# Patient Record
Sex: FEMALE | Race: WHITE | NOT HISPANIC OR LATINO | Employment: FULL TIME | URBAN - METROPOLITAN AREA
[De-identification: names, ages, dates, MRNs, and addresses within clinical notes are randomized per-mention and may not be internally consistent; named-entity substitution may affect disease eponyms.]

---

## 2023-05-23 LAB
ALANINE AMINOTRANSFERASE (SGPT) (U/L) IN SER/PLAS: 13 U/L (ref 7–45)
ALBUMIN (G/DL) IN SER/PLAS: 4.7 G/DL (ref 3.4–5)
ALKALINE PHOSPHATASE (U/L) IN SER/PLAS: 48 U/L (ref 33–136)
ANION GAP IN SER/PLAS: 11 MMOL/L (ref 10–20)
ASPARTATE AMINOTRANSFERASE (SGOT) (U/L) IN SER/PLAS: 18 U/L (ref 9–39)
BASOPHILS (10*3/UL) IN BLOOD BY AUTOMATED COUNT: 0.01 X10E9/L (ref 0–0.1)
BASOPHILS/100 LEUKOCYTES IN BLOOD BY AUTOMATED COUNT: 0.2 % (ref 0–2)
BILIRUBIN TOTAL (MG/DL) IN SER/PLAS: 0.7 MG/DL (ref 0–1.2)
CALCIUM (MG/DL) IN SER/PLAS: 9.3 MG/DL (ref 8.6–10.3)
CARBON DIOXIDE, TOTAL (MMOL/L) IN SER/PLAS: 26 MMOL/L (ref 21–32)
CHLORIDE (MMOL/L) IN SER/PLAS: 91 MMOL/L (ref 98–107)
CREATININE (MG/DL) IN SER/PLAS: 0.82 MG/DL (ref 0.5–1.05)
EOSINOPHILS (10*3/UL) IN BLOOD BY AUTOMATED COUNT: 0.02 X10E9/L (ref 0–0.7)
EOSINOPHILS/100 LEUKOCYTES IN BLOOD BY AUTOMATED COUNT: 0.4 % (ref 0–6)
ERYTHROCYTE DISTRIBUTION WIDTH (RATIO) BY AUTOMATED COUNT: 11.9 % (ref 11.5–14.5)
ERYTHROCYTE MEAN CORPUSCULAR HEMOGLOBIN CONCENTRATION (G/DL) BY AUTOMATED: 34.5 G/DL (ref 32–36)
ERYTHROCYTE MEAN CORPUSCULAR VOLUME (FL) BY AUTOMATED COUNT: 109 FL (ref 80–100)
ERYTHROCYTES (10*6/UL) IN BLOOD BY AUTOMATED COUNT: 3.19 X10E12/L (ref 4–5.2)
GFR FEMALE: 82 ML/MIN/1.73M2
GLUCOSE (MG/DL) IN SER/PLAS: 100 MG/DL (ref 74–99)
HEMATOCRIT (%) IN BLOOD BY AUTOMATED COUNT: 34.8 % (ref 36–46)
HEMOGLOBIN (G/DL) IN BLOOD: 12 G/DL (ref 12–16)
IGG (MG/DL) IN SER/PLAS: 786 MG/DL (ref 700–1600)
IMMATURE GRANULOCYTES/100 LEUKOCYTES IN BLOOD BY AUTOMATED COUNT: 0.4 % (ref 0–0.9)
LEUKOCYTES (10*3/UL) IN BLOOD BY AUTOMATED COUNT: 5.7 X10E9/L (ref 4.4–11.3)
LYMPHOCYTES (10*3/UL) IN BLOOD BY AUTOMATED COUNT: 1.73 X10E9/L (ref 1.2–4.8)
LYMPHOCYTES/100 LEUKOCYTES IN BLOOD BY AUTOMATED COUNT: 30.6 % (ref 13–44)
MAGNESIUM (MG/DL) IN SER/PLAS: 1.43 MG/DL (ref 1.6–2.4)
MONOCYTES (10*3/UL) IN BLOOD BY AUTOMATED COUNT: 0.86 X10E9/L (ref 0.1–1)
MONOCYTES/100 LEUKOCYTES IN BLOOD BY AUTOMATED COUNT: 15.2 % (ref 2–10)
NEUTROPHILS (10*3/UL) IN BLOOD BY AUTOMATED COUNT: 3.02 X10E9/L (ref 1.2–7.7)
NEUTROPHILS/100 LEUKOCYTES IN BLOOD BY AUTOMATED COUNT: 53.2 % (ref 40–80)
PLATELETS (10*3/UL) IN BLOOD AUTOMATED COUNT: 186 X10E9/L (ref 150–450)
POTASSIUM (MMOL/L) IN SER/PLAS: 4.5 MMOL/L (ref 3.5–5.3)
PROTEIN TOTAL: 6.9 G/DL (ref 6.4–8.2)
SODIUM (MMOL/L) IN SER/PLAS: 123 MMOL/L (ref 136–145)
TACROLIMUS (NG/ML) IN BLOOD: 5.8 NG/ML (ref 2–15)
UREA NITROGEN (MG/DL) IN SER/PLAS: 19 MG/DL (ref 6–23)

## 2023-05-24 LAB
CYTOMEGALOVIRUS DNA, PCR COMMENT: NORMAL
CYTOMEGALOVIRUS DNA, PCR IU/ML: NOT DETECTED IU/ML
CYTOMEGALOVIRUS DNA, PCR LOG IU/ML: NORMAL LOG IU/ML
EBV PCR PLASMA LOG IU/ML: NORMAL LOG IU/ML
EBV PCR, QUANT, PLASMA: NOT DETECTED IU/ML

## 2023-08-15 LAB
ALANINE AMINOTRANSFERASE (SGPT) (U/L) IN SER/PLAS: 14 U/L (ref 7–45)
ALBUMIN (G/DL) IN SER/PLAS: 4.5 G/DL (ref 3.4–5)
ALKALINE PHOSPHATASE (U/L) IN SER/PLAS: 51 U/L (ref 33–136)
ANION GAP IN SER/PLAS: 13 MMOL/L (ref 10–20)
ASPARTATE AMINOTRANSFERASE (SGOT) (U/L) IN SER/PLAS: 18 U/L (ref 9–39)
BASOPHILS (10*3/UL) IN BLOOD BY AUTOMATED COUNT: 0.02 X10E9/L (ref 0–0.1)
BASOPHILS/100 LEUKOCYTES IN BLOOD BY AUTOMATED COUNT: 0.4 % (ref 0–2)
BILIRUBIN TOTAL (MG/DL) IN SER/PLAS: 0.5 MG/DL (ref 0–1.2)
CALCIUM (MG/DL) IN SER/PLAS: 9.4 MG/DL (ref 8.6–10.3)
CARBON DIOXIDE, TOTAL (MMOL/L) IN SER/PLAS: 25 MMOL/L (ref 21–32)
CHLORIDE (MMOL/L) IN SER/PLAS: 92 MMOL/L (ref 98–107)
CREATININE (MG/DL) IN SER/PLAS: 0.8 MG/DL (ref 0.5–1.05)
EOSINOPHILS (10*3/UL) IN BLOOD BY AUTOMATED COUNT: 0.02 X10E9/L (ref 0–0.7)
EOSINOPHILS/100 LEUKOCYTES IN BLOOD BY AUTOMATED COUNT: 0.4 % (ref 0–6)
ERYTHROCYTE DISTRIBUTION WIDTH (RATIO) BY AUTOMATED COUNT: 12.2 % (ref 11.5–14.5)
ERYTHROCYTE MEAN CORPUSCULAR HEMOGLOBIN CONCENTRATION (G/DL) BY AUTOMATED: 34.7 G/DL (ref 32–36)
ERYTHROCYTE MEAN CORPUSCULAR VOLUME (FL) BY AUTOMATED COUNT: 112 FL (ref 80–100)
ERYTHROCYTES (10*6/UL) IN BLOOD BY AUTOMATED COUNT: 3.11 X10E12/L (ref 4–5.2)
GFR FEMALE: 84 ML/MIN/1.73M2
GLUCOSE (MG/DL) IN SER/PLAS: 105 MG/DL (ref 74–99)
HEMATOCRIT (%) IN BLOOD BY AUTOMATED COUNT: 34.9 % (ref 36–46)
HEMOGLOBIN (G/DL) IN BLOOD: 12.1 G/DL (ref 12–16)
IGG (MG/DL) IN SER/PLAS: 844 MG/DL (ref 700–1600)
IMMATURE GRANULOCYTES/100 LEUKOCYTES IN BLOOD BY AUTOMATED COUNT: 0.2 % (ref 0–0.9)
LEUKOCYTES (10*3/UL) IN BLOOD BY AUTOMATED COUNT: 4.7 X10E9/L (ref 4.4–11.3)
LYMPHOCYTES (10*3/UL) IN BLOOD BY AUTOMATED COUNT: 1.21 X10E9/L (ref 1.2–4.8)
LYMPHOCYTES/100 LEUKOCYTES IN BLOOD BY AUTOMATED COUNT: 26 % (ref 13–44)
MAGNESIUM (MG/DL) IN SER/PLAS: 1.64 MG/DL (ref 1.6–2.4)
MONOCYTES (10*3/UL) IN BLOOD BY AUTOMATED COUNT: 0.71 X10E9/L (ref 0.1–1)
MONOCYTES/100 LEUKOCYTES IN BLOOD BY AUTOMATED COUNT: 15.2 % (ref 2–10)
NEUTROPHILS (10*3/UL) IN BLOOD BY AUTOMATED COUNT: 2.69 X10E9/L (ref 1.2–7.7)
NEUTROPHILS/100 LEUKOCYTES IN BLOOD BY AUTOMATED COUNT: 57.8 % (ref 40–80)
PLATELETS (10*3/UL) IN BLOOD AUTOMATED COUNT: 201 X10E9/L (ref 150–450)
POTASSIUM (MMOL/L) IN SER/PLAS: 4.7 MMOL/L (ref 3.5–5.3)
PROTEIN TOTAL: 6.9 G/DL (ref 6.4–8.2)
SODIUM (MMOL/L) IN SER/PLAS: 125 MMOL/L (ref 136–145)
TACROLIMUS (NG/ML) IN BLOOD: 6.2 NG/ML (ref 2–15)
UREA NITROGEN (MG/DL) IN SER/PLAS: 20 MG/DL (ref 6–23)

## 2023-09-05 LAB
ALBUMIN (G/DL) IN SER/PLAS: 4.4 G/DL (ref 3.4–5)
ANION GAP IN SER/PLAS: 13 MMOL/L (ref 10–20)
CALCIUM (MG/DL) IN SER/PLAS: 9.3 MG/DL (ref 8.6–10.3)
CARBON DIOXIDE, TOTAL (MMOL/L) IN SER/PLAS: 24 MMOL/L (ref 21–32)
CHLORIDE (MMOL/L) IN SER/PLAS: 98 MMOL/L (ref 98–107)
CREATININE (MG/DL) IN SER/PLAS: 0.71 MG/DL (ref 0.5–1.05)
CREATININE (MG/DL) IN URINE: 14.6 MG/DL (ref 20–320)
CREATININE (MG/DL) IN URINE: 14.6 MG/DL (ref 20–320)
GFR FEMALE: >90 ML/MIN/1.73M2
GLUCOSE (MG/DL) IN SER/PLAS: 89 MG/DL (ref 74–99)
PHOSPHATE (MG/DL) IN SER/PLAS: 2.9 MG/DL (ref 2.5–4.9)
POTASSIUM (MMOL/L) IN SER/PLAS: 4.4 MMOL/L (ref 3.5–5.3)
POTASSIUM URINE RANDOM: 15 MMOL/L
POTASSIUM/CREATININE (MMOL/G) IN URINE: 103 MMOL/G CREAT
SODIUM (MMOL/L) IN SER/PLAS: 131 MMOL/L (ref 136–145)
SODIUM URINE RANDOM: 68 MMOL/L
SODIUM/CREATININE (MMOL/G) IN URINE: 466 MMOL/G CREAT
UREA NITROGEN (MG/DL) IN SER/PLAS: 16 MG/DL (ref 6–23)

## 2023-09-06 LAB
OSMOLALITY, RANDOM URINE: 217 MOSM/KG (ref 200–1200)
OSMOLALITY, SERUM: 278 MOSM/KG H2O (ref 280–300)

## 2023-10-04 DIAGNOSIS — Z94.2 LUNG TRANSPLANT RECIPIENT (MULTI): Primary | ICD-10-CM

## 2023-10-04 RX ORDER — VALACYCLOVIR HYDROCHLORIDE 500 MG/1
500 TABLET, FILM COATED ORAL DAILY
Qty: 90 TABLET | Refills: 3 | Status: SHIPPED | OUTPATIENT
Start: 2023-10-04

## 2023-10-13 DIAGNOSIS — Z94.2 LUNG TRANSPLANT RECIPIENT (MULTI): Primary | ICD-10-CM

## 2023-10-13 RX ORDER — FOLIC ACID 1 MG/1
1 TABLET ORAL DAILY
Qty: 90 TABLET | Refills: 3 | Status: SHIPPED | OUTPATIENT
Start: 2023-10-13

## 2023-11-13 ENCOUNTER — LAB (OUTPATIENT)
Dept: LAB | Facility: LAB | Age: 58
End: 2023-11-13
Payer: COMMERCIAL

## 2023-11-13 DIAGNOSIS — Z94.2 LUNG TRANSPLANTED (MULTI): ICD-10-CM

## 2023-11-13 LAB
25(OH)D3 SERPL-MCNC: 47 NG/ML (ref 30–100)
ALBUMIN SERPL BCP-MCNC: 4.7 G/DL (ref 3.4–5)
ALP SERPL-CCNC: 54 U/L (ref 33–136)
ALT SERPL W P-5'-P-CCNC: 17 U/L (ref 7–45)
ANION GAP SERPL CALC-SCNC: 11 MMOL/L (ref 10–20)
AST SERPL W P-5'-P-CCNC: 18 U/L (ref 9–39)
BASOPHILS # BLD AUTO: 0.01 X10*3/UL (ref 0–0.1)
BASOPHILS NFR BLD AUTO: 0.2 %
BILIRUB SERPL-MCNC: 0.7 MG/DL (ref 0–1.2)
BUN SERPL-MCNC: 25 MG/DL (ref 6–23)
CALCIUM SERPL-MCNC: 9.2 MG/DL (ref 8.6–10.3)
CHLORIDE SERPL-SCNC: 97 MMOL/L (ref 98–107)
CO2 SERPL-SCNC: 27 MMOL/L (ref 21–32)
CREAT SERPL-MCNC: 1.01 MG/DL (ref 0.5–1.05)
EOSINOPHIL # BLD AUTO: 0.02 X10*3/UL (ref 0–0.7)
EOSINOPHIL NFR BLD AUTO: 0.3 %
ERYTHROCYTE [DISTWIDTH] IN BLOOD BY AUTOMATED COUNT: 12.3 % (ref 11.5–14.5)
GFR SERPL CREATININE-BSD FRML MDRD: 64 ML/MIN/1.73M*2
GLUCOSE SERPL-MCNC: 106 MG/DL (ref 74–99)
HCT VFR BLD AUTO: 36 % (ref 36–46)
HGB BLD-MCNC: 12.2 G/DL (ref 12–16)
IMM GRANULOCYTES # BLD AUTO: 0.06 X10*3/UL (ref 0–0.7)
IMM GRANULOCYTES NFR BLD AUTO: 1 % (ref 0–0.9)
LYMPHOCYTES # BLD AUTO: 1.92 X10*3/UL (ref 1.2–4.8)
LYMPHOCYTES NFR BLD AUTO: 32.9 %
MAGNESIUM SERPL-MCNC: 1.61 MG/DL (ref 1.6–2.4)
MCH RBC QN AUTO: 37.8 PG (ref 26–34)
MCHC RBC AUTO-ENTMCNC: 33.9 G/DL (ref 32–36)
MCV RBC AUTO: 112 FL (ref 80–100)
MONOCYTES # BLD AUTO: 0.74 X10*3/UL (ref 0.1–1)
MONOCYTES NFR BLD AUTO: 12.7 %
NEUTROPHILS # BLD AUTO: 3.08 X10*3/UL (ref 1.2–7.7)
NEUTROPHILS NFR BLD AUTO: 52.9 %
NRBC BLD-RTO: 0 /100 WBCS (ref 0–0)
PLATELET # BLD AUTO: 190 X10*3/UL (ref 150–450)
POTASSIUM SERPL-SCNC: 3.8 MMOL/L (ref 3.5–5.3)
PROT SERPL-MCNC: 6.9 G/DL (ref 6.4–8.2)
RBC # BLD AUTO: 3.23 X10*6/UL (ref 4–5.2)
SODIUM SERPL-SCNC: 131 MMOL/L (ref 136–145)
WBC # BLD AUTO: 5.8 X10*3/UL (ref 4.4–11.3)

## 2023-11-13 PROCEDURE — 86833 HLA CLASS II HIGH DEFIN QUAL: CPT

## 2023-11-13 PROCEDURE — 80053 COMPREHEN METABOLIC PANEL: CPT

## 2023-11-13 PROCEDURE — 86832 HLA CLASS I HIGH DEFIN QUAL: CPT

## 2023-11-13 PROCEDURE — 83735 ASSAY OF MAGNESIUM: CPT

## 2023-11-13 PROCEDURE — 80197 ASSAY OF TACROLIMUS: CPT

## 2023-11-13 PROCEDURE — 85025 COMPLETE CBC W/AUTO DIFF WBC: CPT

## 2023-11-13 PROCEDURE — 36415 COLL VENOUS BLD VENIPUNCTURE: CPT

## 2023-11-13 PROCEDURE — 82306 VITAMIN D 25 HYDROXY: CPT

## 2023-11-13 PROCEDURE — 82784 ASSAY IGA/IGD/IGG/IGM EACH: CPT

## 2023-11-14 ENCOUNTER — APPOINTMENT (OUTPATIENT)
Dept: TRANSPLANT | Facility: HOSPITAL | Age: 58
End: 2023-11-14

## 2023-11-14 ENCOUNTER — HOSPITAL ENCOUNTER (OUTPATIENT)
Dept: RESPIRATORY THERAPY | Facility: HOSPITAL | Age: 58
Discharge: HOME | End: 2023-11-14
Payer: COMMERCIAL

## 2023-11-14 ENCOUNTER — OFFICE VISIT (OUTPATIENT)
Dept: TRANSPLANT | Facility: HOSPITAL | Age: 58
End: 2023-11-14
Payer: COMMERCIAL

## 2023-11-14 ENCOUNTER — APPOINTMENT (OUTPATIENT)
Dept: TRANSPLANT | Facility: HOSPITAL | Age: 58
End: 2023-11-14
Payer: COMMERCIAL

## 2023-11-14 ENCOUNTER — HOSPITAL ENCOUNTER (OUTPATIENT)
Dept: RESPIRATORY THERAPY | Facility: HOSPITAL | Age: 58
End: 2023-11-14
Payer: COMMERCIAL

## 2023-11-14 VITALS
HEART RATE: 82 BPM | WEIGHT: 174.2 LBS | SYSTOLIC BLOOD PRESSURE: 113 MMHG | TEMPERATURE: 97.7 F | BODY MASS INDEX: 27.28 KG/M2 | OXYGEN SATURATION: 96 % | DIASTOLIC BLOOD PRESSURE: 76 MMHG

## 2023-11-14 DIAGNOSIS — Z94.2 S/P LUNG TRANSPLANT (MULTI): ICD-10-CM

## 2023-11-14 DIAGNOSIS — Z94.2 LUNG TRANSPLANT RECIPIENT (MULTI): Primary | ICD-10-CM

## 2023-11-14 PROBLEM — I10 BENIGN ESSENTIAL HYPERTENSION: Status: ACTIVE | Noted: 2023-11-14

## 2023-11-14 PROBLEM — N60.19 CYSTIC BREAST: Status: ACTIVE | Noted: 2023-11-14

## 2023-11-14 PROBLEM — D22.30 MELANOCYTIC NEVI OF UNSPECIFIED PART OF FACE: Status: ACTIVE | Noted: 2022-12-19

## 2023-11-14 PROBLEM — L82.1 OTHER SEBORRHEIC KERATOSIS: Status: ACTIVE | Noted: 2022-12-19

## 2023-11-14 PROBLEM — M85.80 OSTEOPENIA: Status: ACTIVE | Noted: 2023-11-14

## 2023-11-14 PROBLEM — J31.0 CHRONIC RHINITIS: Status: ACTIVE | Noted: 2023-11-14

## 2023-11-14 PROBLEM — M79.671 PAIN IN BOTH FEET: Status: ACTIVE | Noted: 2018-08-20

## 2023-11-14 PROBLEM — G43.009 COMMON MIGRAINE WITHOUT AURA: Status: ACTIVE | Noted: 2023-11-14

## 2023-11-14 PROBLEM — Z79.60 LONG-TERM USE OF IMMUNOSUPPRESSANT MEDICATION: Status: ACTIVE | Noted: 2023-11-14

## 2023-11-14 PROBLEM — M79.672 PAIN IN BOTH FEET: Status: ACTIVE | Noted: 2018-08-20

## 2023-11-14 PROBLEM — N60.11 FIBROCYSTIC DISEASE OF RIGHT BREAST: Status: ACTIVE | Noted: 2023-11-14

## 2023-11-14 PROBLEM — E83.42 HYPOMAGNESEMIA: Status: ACTIVE | Noted: 2023-11-14

## 2023-11-14 PROBLEM — Z98.890 STATUS POST RIGHT BREAST BIOPSY: Status: ACTIVE | Noted: 2023-11-14

## 2023-11-14 PROBLEM — D22.5 MELANOCYTIC NEVI OF TRUNK: Status: ACTIVE | Noted: 2022-12-19

## 2023-11-14 PROBLEM — Z94.9 TRANSPLANT: Status: ACTIVE | Noted: 2023-11-14

## 2023-11-14 LAB
CMV DNA SERPL NAA+PROBE-LOG IU: NORMAL {LOG_IU}/ML
IGG SERPL-MCNC: 877 MG/DL (ref 700–1600)
LABORATORY COMMENT REPORT: NOT DETECTED
MGC ASCENT PFT - FEV1 - PRE: 1.99
MGC ASCENT PFT - FEV1 - PREDICTED: 2.64
MGC ASCENT PFT - FVC - PRE: 3.02
MGC ASCENT PFT - FVC - PREDICTED: 3.34
TACROLIMUS BLD-MCNC: 6.8 NG/ML

## 2023-11-14 PROCEDURE — 3074F SYST BP LT 130 MM HG: CPT | Performed by: INTERNAL MEDICINE

## 2023-11-14 PROCEDURE — 99215 OFFICE O/P EST HI 40 MIN: CPT | Performed by: INTERNAL MEDICINE

## 2023-11-14 PROCEDURE — 3078F DIAST BP <80 MM HG: CPT | Performed by: INTERNAL MEDICINE

## 2023-11-14 PROCEDURE — 94010 BREATHING CAPACITY TEST: CPT | Performed by: INTERNAL MEDICINE

## 2023-11-14 PROCEDURE — 94010 BREATHING CAPACITY TEST: CPT

## 2023-11-14 RX ORDER — LOSARTAN POTASSIUM 50 MG/1
100 TABLET ORAL DAILY
COMMUNITY
End: 2024-03-26 | Stop reason: WASHOUT

## 2023-11-14 RX ORDER — ALENDRONATE SODIUM 70 MG/1
70 TABLET ORAL
COMMUNITY
Start: 2014-02-14

## 2023-11-14 RX ORDER — FLUTICASONE PROPIONATE 50 MCG
1 SPRAY, SUSPENSION (ML) NASAL DAILY
COMMUNITY

## 2023-11-14 RX ORDER — ATENOLOL 25 MG/1
25 TABLET ORAL DAILY
COMMUNITY
Start: 2003-05-12

## 2023-11-14 RX ORDER — TACROLIMUS 1 MG/1
2 CAPSULE ORAL 2 TIMES DAILY
COMMUNITY

## 2023-11-14 RX ORDER — DAPSONE 100 MG/1
100 TABLET ORAL 3 TIMES WEEKLY
COMMUNITY
Start: 2003-05-12

## 2023-11-14 RX ORDER — AZITHROMYCIN 250 MG/1
250 TABLET, FILM COATED ORAL DAILY
Qty: 7 TABLET | Refills: 0 | Status: SHIPPED | OUTPATIENT
Start: 2023-11-14 | End: 2023-11-21

## 2023-11-14 RX ORDER — MODERNA COVID-19 VACCINE, BIVALENT 25; 25 UG/.5ML; UG/.5ML
0.5 INJECTION, SUSPENSION INTRAMUSCULAR ONCE
COMMUNITY
Start: 2022-12-08 | End: 2024-03-26 | Stop reason: WASHOUT

## 2023-11-14 RX ORDER — MYCOPHENOLATE MOFETIL 250 MG/1
1250 CAPSULE ORAL 2 TIMES DAILY
COMMUNITY
End: 2024-01-08 | Stop reason: SDUPTHER

## 2023-11-14 RX ORDER — ELETRIPTAN HYDROBROMIDE 20 MG/1
20 TABLET, FILM COATED ORAL ONCE AS NEEDED
COMMUNITY
Start: 2013-12-03 | End: 2024-03-26 | Stop reason: SDUPTHER

## 2023-11-14 ASSESSMENT — ENCOUNTER SYMPTOMS
ENDOCRINE NEGATIVE: 1
EYES NEGATIVE: 1
NEUROLOGICAL NEGATIVE: 1
HEMATOLOGIC/LYMPHATIC NEGATIVE: 1
CARDIOVASCULAR NEGATIVE: 1
RESPIRATORY NEGATIVE: 1
GASTROINTESTINAL NEGATIVE: 1
PSYCHIATRIC NEGATIVE: 1
MUSCULOSKELETAL NEGATIVE: 1
ALLERGIC/IMMUNOLOGIC NEGATIVE: 1
CONSTITUTIONAL NEGATIVE: 1

## 2023-11-14 ASSESSMENT — PAIN SCALES - GENERAL: PAINLEVEL: 0-NO PAIN

## 2023-11-15 LAB
HLA RESULTS: NORMAL
HLA-A+B+C AB NFR SER: NORMAL %
HLA-DP+DQ+DR AB NFR SER: NORMAL %

## 2023-11-16 DIAGNOSIS — Z94.2 LUNG TRANSPLANTED (MULTI): ICD-10-CM

## 2023-12-11 ASSESSMENT — DERMATOLOGY QUALITY OF LIFE (QOL) ASSESSMENT
RATE HOW BOTHERED YOU ARE BY SYMPTOMS OF YOUR SKIN PROBLEM (EG, ITCHING, STINGING BURNING, HURTING OR SKIN IRRITATION): 0 - NEVER BOTHERED
RATE HOW BOTHERED YOU ARE BY SYMPTOMS OF YOUR SKIN PROBLEM (EG, ITCHING, STINGING BURNING, HURTING OR SKIN IRRITATION): 0 - NEVER BOTHERED
WHAT SINGLE SKIN CONDITION LISTED BELOW IS THE PATIENT ANSWERING THE QUALITY-OF-LIFE ASSESSMENT QUESTIONS ABOUT: NONE OF THE ABOVE
RATE HOW BOTHERED YOU ARE BY EFFECTS OF YOUR SKIN PROBLEMS ON YOUR ACTIVITIES (EG, GOING OUT, ACCOMPLISHING WHAT YOU WANT, WORK ACTIVITIES OR YOUR RELATIONSHIPS WITH OTHERS): 0 - NEVER BOTHERED
RATE HOW EMOTIONALLY BOTHERED YOU ARE BY YOUR SKIN PROBLEM (FOR EXAMPLE, WORRY, EMBARRASSMENT, FRUSTRATION): 0 - NEVER BOTHERED
RATE HOW EMOTIONALLY BOTHERED YOU ARE BY YOUR SKIN PROBLEM (FOR EXAMPLE, WORRY, EMBARRASSMENT, FRUSTRATION): 0 - NEVER BOTHERED
RATE HOW BOTHERED YOU ARE BY EFFECTS OF YOUR SKIN PROBLEMS ON YOUR ACTIVITIES (EG, GOING OUT, ACCOMPLISHING WHAT YOU WANT, WORK ACTIVITIES OR YOUR RELATIONSHIPS WITH OTHERS): 0 - NEVER BOTHERED
WHAT SINGLE SKIN CONDITION LISTED BELOW IS THE PATIENT ANSWERING THE QUALITY-OF-LIFE ASSESSMENT QUESTIONS ABOUT: NONE OF THE ABOVE

## 2023-12-15 ENCOUNTER — LAB (OUTPATIENT)
Dept: LAB | Facility: LAB | Age: 58
End: 2023-12-15
Payer: COMMERCIAL

## 2023-12-15 DIAGNOSIS — Z94.2 LUNG TRANSPLANTED (MULTI): ICD-10-CM

## 2023-12-15 LAB
CHOLEST SERPL-MCNC: 188 MG/DL (ref 0–199)
CHOLESTEROL/HDL RATIO: 2.7
HDLC SERPL-MCNC: 69.5 MG/DL
LDLC SERPL CALC-MCNC: 102 MG/DL
NON HDL CHOLESTEROL: 119 MG/DL (ref 0–149)
TRIGL SERPL-MCNC: 82 MG/DL (ref 0–149)
VLDL: 16 MG/DL (ref 0–40)

## 2023-12-15 PROCEDURE — 80061 LIPID PANEL: CPT

## 2023-12-15 PROCEDURE — 36415 COLL VENOUS BLD VENIPUNCTURE: CPT

## 2023-12-18 ENCOUNTER — HOSPITAL ENCOUNTER (OUTPATIENT)
Dept: RADIOLOGY | Facility: HOSPITAL | Age: 58
Discharge: HOME | End: 2023-12-18
Payer: COMMERCIAL

## 2023-12-18 ENCOUNTER — OFFICE VISIT (OUTPATIENT)
Dept: DERMATOLOGY | Facility: HOSPITAL | Age: 58
End: 2023-12-18
Payer: COMMERCIAL

## 2023-12-18 DIAGNOSIS — B35.1 ONYCHOMYCOSIS: ICD-10-CM

## 2023-12-18 DIAGNOSIS — L21.9 SEBORRHEIC DERMATITIS: ICD-10-CM

## 2023-12-18 DIAGNOSIS — Z12.83 SCREENING EXAM FOR SKIN CANCER: ICD-10-CM

## 2023-12-18 DIAGNOSIS — L82.1 SEBORRHEIC KERATOSIS: ICD-10-CM

## 2023-12-18 DIAGNOSIS — D22.9 MULTIPLE BENIGN NEVI: Primary | ICD-10-CM

## 2023-12-18 DIAGNOSIS — Z94.2 LUNG TRANSPLANTED (MULTI): ICD-10-CM

## 2023-12-18 PROCEDURE — 99213 OFFICE O/P EST LOW 20 MIN: CPT | Performed by: DERMATOLOGY

## 2023-12-18 PROCEDURE — 71046 X-RAY EXAM CHEST 2 VIEWS: CPT | Performed by: RADIOLOGY

## 2023-12-18 PROCEDURE — 71046 X-RAY EXAM CHEST 2 VIEWS: CPT | Mod: FY

## 2023-12-18 RX ORDER — CIPROFLOXACIN AND DEXAMETHASONE 3; 1 MG/ML; MG/ML
4 SUSPENSION/ DROPS AURICULAR (OTIC) 2 TIMES DAILY
Qty: 7.5 ML | Refills: 3 | Status: SHIPPED | OUTPATIENT
Start: 2023-12-18 | End: 2024-03-26 | Stop reason: WASHOUT

## 2023-12-18 ASSESSMENT — DERMATOLOGY PATIENT ASSESSMENT
ARE YOU ON BIRTH CONTROL: NO
DO YOU USE A TANNING BED: NO
HAVE YOU HAD OR DO YOU HAVE VASCULAR DISEASE: NO
HAVE YOU HAD OR DO YOU HAVE A STAPH INFECTION: NO
DO YOU HAVE ANY NEW OR CHANGING LESIONS: NO
DO YOU HAVE IRREGULAR MENSTRUAL CYCLES: NO
DO YOU USE SUNSCREEN: OCCASIONALLY
ARE YOU TRYING TO GET PREGNANT: NO

## 2023-12-18 ASSESSMENT — DERMATOLOGY QUALITY OF LIFE (QOL) ASSESSMENT
DID YOU DOCUMENT A PATIENT REASON(S) FOR NOT USING THE QUALITY OF LIFE ASSESSMENT: NO
WAS THE PATIENT DIAGNOSED WITH A SKIN CONDITION THAT IS INCLUDED IN THE DENOMINATOR DEFINITION (E.G.PSORIASIS, DERMATITIS) BUT IDENTIFIED A SKIN CONDITION THAT IS NOT (E.G. MELANOMA, NEVI) THE MAIN CONDITION ON THEIR ASSESSMENT: NO
RATE HOW BOTHERED YOU ARE BY SYMPTOMS OF YOUR SKIN PROBLEM (EG, ITCHING, STINGING BURNING, HURTING OR SKIN IRRITATION): 0 - NEVER BOTHERED
RATE HOW BOTHERED YOU ARE BY EFFECTS OF YOUR SKIN PROBLEMS ON YOUR ACTIVITIES (EG, GOING OUT, ACCOMPLISHING WHAT YOU WANT, WORK ACTIVITIES OR YOUR RELATIONSHIPS WITH OTHERS): 0 - NEVER BOTHERED
DATE THE QUALITY-OF-LIFE ASSESSMENT WAS COMPLETED: 66826
ARE THERE EXCLUSIONS OR EXCEPTIONS FOR THE QUALITY OF LIFE ASSESSMENT: NO
RATE HOW EMOTIONALLY BOTHERED YOU ARE BY YOUR SKIN PROBLEM (FOR EXAMPLE, WORRY, EMBARRASSMENT, FRUSTRATION): 0 - NEVER BOTHERED
DID THE PATIENT HAVE A SEVERE MENTAL AND/OR PHYSICAL INCAPACITY THAT PREVENTED HIM OR HER FROM COMPLETING THE ASSESSMENT: NO

## 2023-12-18 ASSESSMENT — PATIENT GLOBAL ASSESSMENT (PGA): PATIENT GLOBAL ASSESSMENT: PATIENT GLOBAL ASSESSMENT:  1 - CLEAR

## 2023-12-18 ASSESSMENT — ITCH NUMERIC RATING SCALE: HOW SEVERE IS YOUR ITCHING?: 0

## 2023-12-18 NOTE — PROGRESS NOTES
Subjective     Carmen Ash is a 58 y.o. female who presents for the following: Skin Check.     Lung transplant 1999 still on immunosuppression, doing well    Review of Systems:  No other skin or systemic complaints other than what is documented elsewhere in the note.    The following portions of the chart were reviewed this encounter and updated as appropriate:  Tobacco  Allergies  Meds  Problems  Med Hx  Surg Hx         Skin Cancer History  No skin cancer on file.      Specialty Problems          Dermatology Problems    Melanocytic nevi of trunk    Melanocytic nevi of unspecified part of face    Other seborrheic keratosis        Objective   Well appearing patient in no apparent distress; mood and affect are within normal limits.    A full examination was performed including scalp, head, eyes, ears, nose, lips, neck, chest, axillae, abdomen, back, buttocks, bilateral upper extremities, bilateral lower extremities, hands, feet, fingers, toes, fingernails, and toenails. All findings within normal limits unless otherwise noted below.    Assessment/Plan   1. Multiple benign nevi  Brown and tan macules and papules with reassuring findings on dermoscopy    -These lesions have benign, reassuring patterns on dermoscopy  -Recommend continued self observation, and to contact the office if any changes in nevi are noticed    2. Seborrheic keratosis  Stuck on, waxy macule(s)/papule(s)/plaque(s) with comedo-like openings and milia like cysts    -Discussed the nature of the diagnosis  -Reassurance, recommend continued observation    3. Screening exam for skin cancer    Full body skin exam  -No lesions concerning for malignancy on the remainder the skin exam today   - The ugly duckling sign was discussed. Monitor for any skin lesions that are different in color, shape, or size than others on body  -Sun protection was discussed. Recommend SPF 30+, hats with brims, sun protective clothing, and avoiding sun exposure between 10  AM and 2 PM whenever possible  -Recommend regular skin exams or sooner if new or changing lesions       Related Procedures  Follow Up In Dermatology - Established Patient    4. Onychomycosis (3)  Left Foot - Anterior, Left Thumb Nail Plate, Right Foot - Anterior  Thickened, discolored nail plate(s) with subungual debris. All 10 toe nails, left thumb nail    -Discussed nature of the condition  -This is a chronic issue that is often difficult to treat and will often recur once treated  -Reviewed treatment options    - Declines oral at this time and also declines topical, no pain    5. Seborrheic dermatitis  Left Ear, Right Ear, Scalp  Clear on exam today    Well-controlled with OTC dandruff shampoo alternating with regular dandruff shampoo    Also uses ear drops as needed, needs refill    Related Medications  ciprofloxacin-dexamethasone (Ciprodex) otic suspension  Administer 4 drops into each ear 2 times a day. For up to 2 weeks as needed for scaling/itching        Full Skin Exam 1 year

## 2024-01-07 ENCOUNTER — TELEPHONE (OUTPATIENT)
Dept: CARDIOLOGY | Facility: HOSPITAL | Age: 59
End: 2024-01-07
Payer: COMMERCIAL

## 2024-01-07 NOTE — TELEPHONE ENCOUNTER
"Mrs. Ash is a remote lung transplant patient > 20 years ago who called this morning stating that she tested positive for COVID. She originally began having a dry cough 5 days ago (last Wednesday) but overall did not feel bad. This morning she awoke and felt \"achy\" so she tested for COVID. She denies fevers, chills or GI symptoms. She endorses a slightly productive cough now but no SOB. She has had COVID in the past.     IGG levels on 11/13/23 were appropriate: 877.     I discussed her symptoms with Dr. Chung who agrees on supportive care. Unfortunately, Mrs. Ash is on the tail end of starting Paxlovid, and the risks of this medication (especially interactions with immunosuppression) outweigh the benefits given her mild symptoms.     Mrs. Ash was advised to decrease her MMF from 1250mg BID to 500mg BID during her active infection. I instructed her to call the lung transplant offices in 5 days to assess her symptoms and discuss increasing her MMF dose at that time. I also educated her on the importance of isolating for 10xdays since symptoms, and potentially up to 20xdays if symptoms remain. I also advised her to wear a mask in public if she does leave isolation. Lastly, I encouraged her to obtain a pulse-ox to monitor her SpO2. She states she will have her mother in law bring one over today.     *Of note, prescription in [facility] for MMF was not changed to reflect 500mg BID, as I anticipate the lung transplant team will return to her previous dose*   "

## 2024-01-08 ENCOUNTER — TELEPHONE (OUTPATIENT)
Dept: TRANSPLANT | Facility: HOSPITAL | Age: 59
End: 2024-01-08
Payer: COMMERCIAL

## 2024-01-08 DIAGNOSIS — Z94.2 LUNG TRANSPLANT RECIPIENT (MULTI): Primary | ICD-10-CM

## 2024-01-08 RX ORDER — MYCOPHENOLATE MOFETIL 250 MG/1
1250 CAPSULE ORAL 2 TIMES DAILY
Qty: 600 CAPSULE | Refills: 11 | Status: SHIPPED | OUTPATIENT
Start: 2024-01-08

## 2024-01-08 NOTE — TELEPHONE ENCOUNTER
Patient called for a refill for her [facility] supply with 12 refills.

## 2024-01-12 ENCOUNTER — TELEPHONE (OUTPATIENT)
Dept: TRANSPLANT | Facility: HOSPITAL | Age: 59
End: 2024-01-12
Payer: COMMERCIAL

## 2024-01-12 NOTE — TELEPHONE ENCOUNTER
Called and spoke with patient     She wanted to know if she should continue taking decreased dose of cellcept or go back to her original dosage.   Dr. Chung advised her continue to take the decreased dose for another week and to let us know how she is feeling. Advised to call us back to update us and then we can make the decision about increasing her dose again.   Patient stated she is feeling better   All questions answered

## 2024-01-19 ENCOUNTER — TELEPHONE (OUTPATIENT)
Dept: TRANSPLANT | Facility: HOSPITAL | Age: 59
End: 2024-01-19
Payer: COMMERCIAL

## 2024-01-19 NOTE — TELEPHONE ENCOUNTER
Patient called to report that she has been feeling back to normal, no fever no shortness of breath, wants to know if she can go back on her regular medication.

## 2024-01-19 NOTE — TELEPHONE ENCOUNTER
Patient called stating she is feeling better   Returned call. Would like to go back to her normal medication dosages that were decreased while she was recovering from covid.   Dr. Chung notified and advised that she can go back to her normal dosages   All questions answered

## 2024-03-20 ENCOUNTER — LAB (OUTPATIENT)
Dept: LAB | Facility: LAB | Age: 59
End: 2024-03-20
Payer: COMMERCIAL

## 2024-03-20 DIAGNOSIS — Z94.2 LUNG TRANSPLANTED (MULTI): ICD-10-CM

## 2024-03-20 LAB
ALBUMIN SERPL BCP-MCNC: 4.5 G/DL (ref 3.4–5)
ALP SERPL-CCNC: 52 U/L (ref 33–136)
ALT SERPL W P-5'-P-CCNC: 21 U/L (ref 7–45)
ANION GAP SERPL CALC-SCNC: 14 MMOL/L (ref 10–20)
AST SERPL W P-5'-P-CCNC: 17 U/L (ref 9–39)
BASOPHILS # BLD AUTO: 0.01 X10*3/UL (ref 0–0.1)
BASOPHILS NFR BLD AUTO: 0.2 %
BILIRUB SERPL-MCNC: 0.7 MG/DL (ref 0–1.2)
BUN SERPL-MCNC: 20 MG/DL (ref 6–23)
CALCIUM SERPL-MCNC: 9.1 MG/DL (ref 8.6–10.6)
CHLORIDE SERPL-SCNC: 90 MMOL/L (ref 98–107)
CO2 SERPL-SCNC: 27 MMOL/L (ref 21–32)
CREAT SERPL-MCNC: 0.63 MG/DL (ref 0.5–1.05)
EGFRCR SERPLBLD CKD-EPI 2021: >90 ML/MIN/1.73M*2
EOSINOPHIL # BLD AUTO: 0.03 X10*3/UL (ref 0–0.7)
EOSINOPHIL NFR BLD AUTO: 0.6 %
ERYTHROCYTE [DISTWIDTH] IN BLOOD BY AUTOMATED COUNT: 13.2 % (ref 11.5–14.5)
GLUCOSE SERPL-MCNC: 138 MG/DL (ref 74–99)
HCT VFR BLD AUTO: 35.4 % (ref 36–46)
HGB BLD-MCNC: 12.2 G/DL (ref 12–16)
IMM GRANULOCYTES # BLD AUTO: 0.02 X10*3/UL (ref 0–0.7)
IMM GRANULOCYTES NFR BLD AUTO: 0.4 % (ref 0–0.9)
LYMPHOCYTES # BLD AUTO: 1.44 X10*3/UL (ref 1.2–4.8)
LYMPHOCYTES NFR BLD AUTO: 28.7 %
MAGNESIUM SERPL-MCNC: 1.58 MG/DL (ref 1.6–2.4)
MCH RBC QN AUTO: 37.2 PG (ref 26–34)
MCHC RBC AUTO-ENTMCNC: 34.5 G/DL (ref 32–36)
MCV RBC AUTO: 108 FL (ref 80–100)
MONOCYTES # BLD AUTO: 0.74 X10*3/UL (ref 0.1–1)
MONOCYTES NFR BLD AUTO: 14.8 %
NEUTROPHILS # BLD AUTO: 2.77 X10*3/UL (ref 1.2–7.7)
NEUTROPHILS NFR BLD AUTO: 55.3 %
NRBC BLD-RTO: 0 /100 WBCS (ref 0–0)
PLATELET # BLD AUTO: 200 X10*3/UL (ref 150–450)
POTASSIUM SERPL-SCNC: 4.5 MMOL/L (ref 3.5–5.3)
PROT SERPL-MCNC: 6.4 G/DL (ref 6.4–8.2)
RBC # BLD AUTO: 3.28 X10*6/UL (ref 4–5.2)
SODIUM SERPL-SCNC: 126 MMOL/L (ref 136–145)
WBC # BLD AUTO: 5 X10*3/UL (ref 4.4–11.3)

## 2024-03-20 PROCEDURE — 83735 ASSAY OF MAGNESIUM: CPT

## 2024-03-20 PROCEDURE — 36415 COLL VENOUS BLD VENIPUNCTURE: CPT

## 2024-03-20 PROCEDURE — 85025 COMPLETE CBC W/AUTO DIFF WBC: CPT

## 2024-03-20 PROCEDURE — 80197 ASSAY OF TACROLIMUS: CPT

## 2024-03-20 PROCEDURE — 82784 ASSAY IGA/IGD/IGG/IGM EACH: CPT

## 2024-03-20 PROCEDURE — 80053 COMPREHEN METABOLIC PANEL: CPT

## 2024-03-21 ENCOUNTER — DOCUMENTATION (OUTPATIENT)
Dept: TRANSPLANT | Facility: HOSPITAL | Age: 59
End: 2024-03-21
Payer: COMMERCIAL

## 2024-03-21 DIAGNOSIS — Z94.2 LUNG TRANSPLANTED (MULTI): ICD-10-CM

## 2024-03-21 LAB
CMV DNA SERPL NAA+PROBE-LOG IU: ABNORMAL {LOG_IU}/ML
IGG SERPL-MCNC: 890 MG/DL (ref 700–1600)
LABORATORY COMMENT REPORT: ABNORMAL
TACROLIMUS BLD-MCNC: 5.1 NG/ML

## 2024-03-21 NOTE — PROGRESS NOTES
Labs reviewed on 3/21/24  WBC 5.0 ANC 2.77  Hbg 12.2  Platelets 200  Creatinine 0.63  Magnesium 1.58  Prograf 5.1 Goal 5-7 Dose 2mg BID     -Changes made: None  -Labs due next 4/22    [employer] message sent to patient

## 2024-03-26 ENCOUNTER — HOSPITAL ENCOUNTER (OUTPATIENT)
Dept: RESPIRATORY THERAPY | Facility: HOSPITAL | Age: 59
Discharge: HOME | End: 2024-03-26
Payer: COMMERCIAL

## 2024-03-26 ENCOUNTER — OFFICE VISIT (OUTPATIENT)
Dept: TRANSPLANT | Facility: HOSPITAL | Age: 59
End: 2024-03-26
Payer: COMMERCIAL

## 2024-03-26 ENCOUNTER — DOCUMENTATION (OUTPATIENT)
Dept: CARDIOLOGY | Facility: HOSPITAL | Age: 59
End: 2024-03-26
Payer: COMMERCIAL

## 2024-03-26 VITALS
OXYGEN SATURATION: 96 % | HEART RATE: 74 BPM | SYSTOLIC BLOOD PRESSURE: 120 MMHG | TEMPERATURE: 97.2 F | WEIGHT: 172.5 LBS | DIASTOLIC BLOOD PRESSURE: 78 MMHG | BODY MASS INDEX: 27.02 KG/M2

## 2024-03-26 DIAGNOSIS — Z94.2 HISTORY OF LUNG TRANSPLANT (MULTI): Primary | ICD-10-CM

## 2024-03-26 DIAGNOSIS — Z94.2 LUNG TRANSPLANTED (MULTI): ICD-10-CM

## 2024-03-26 DIAGNOSIS — Z94.2 LUNG TRANSPLANTED (MULTI): Primary | ICD-10-CM

## 2024-03-26 DIAGNOSIS — Z79.60 LONG-TERM USE OF IMMUNOSUPPRESSANT MEDICATION: ICD-10-CM

## 2024-03-26 PROCEDURE — 99215 OFFICE O/P EST HI 40 MIN: CPT | Performed by: INTERNAL MEDICINE

## 2024-03-26 PROCEDURE — 99215 OFFICE O/P EST HI 40 MIN: CPT | Mod: 25 | Performed by: INTERNAL MEDICINE

## 2024-03-26 PROCEDURE — 94010 BREATHING CAPACITY TEST: CPT | Performed by: INTERNAL MEDICINE

## 2024-03-26 PROCEDURE — 94010 BREATHING CAPACITY TEST: CPT

## 2024-03-26 RX ORDER — ELETRIPTAN HYDROBROMIDE 20 MG/1
20 TABLET, FILM COATED ORAL ONCE AS NEEDED
Qty: 6 TABLET | Refills: 3 | Status: SHIPPED | OUTPATIENT
Start: 2024-03-26

## 2024-03-26 RX ORDER — AZITHROMYCIN 250 MG/1
250 TABLET, FILM COATED ORAL DAILY
COMMUNITY

## 2024-03-26 RX ORDER — PREDNISONE 1 MG/1
3 TABLET ORAL DAILY
COMMUNITY
End: 2024-05-07

## 2024-03-26 RX ORDER — AZITHROMYCIN 250 MG/1
250 TABLET, FILM COATED ORAL DAILY
Qty: 90 TABLET | Refills: 3 | Status: SHIPPED | OUTPATIENT
Start: 2024-03-26 | End: 2025-03-26

## 2024-03-26 ASSESSMENT — PAIN SCALES - GENERAL: PAINLEVEL: 0-NO PAIN

## 2024-03-26 ASSESSMENT — ENCOUNTER SYMPTOMS
MUSCULOSKELETAL NEGATIVE: 1
ENDOCRINE NEGATIVE: 1
ALLERGIC/IMMUNOLOGIC NEGATIVE: 1
GASTROINTESTINAL NEGATIVE: 1
PSYCHIATRIC NEGATIVE: 1
CONSTITUTIONAL NEGATIVE: 1
RESPIRATORY NEGATIVE: 1
CARDIOVASCULAR NEGATIVE: 1
EYES NEGATIVE: 1
NEUROLOGICAL NEGATIVE: 1
HEMATOLOGIC/LYMPHATIC NEGATIVE: 1

## 2024-03-26 NOTE — PROGRESS NOTES
Patient seen in clinic, states she is overall doing well. Patient is in need of medication refills, script sent. Patient up to date on routine care and has no concerns at this time. Patient to return to clinic in 3 months with cr

## 2024-03-26 NOTE — PROGRESS NOTES
Chief Complaint:  59 y.o.  y/o M with history of s/p LTx . who was last seen on  and presents today for: Follow-up transplant.      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 2.64 P   FVC - PRE 3.02 P   FEV1 - Pre 1.99        Current Immunosuppression:   -Prograf, Cellcept, Prednisone    Interval Medical History:  Since his last visit, no complaints. Transient decrease in Na, recovered. Again drinking large amounts of water during that time.     Patient Active Problem List   Diagnosis    Transplant    History of lung transplant (CMS/HCC)    Status post right breast biopsy    Plantar fascial fibromatosis    Pain in both feet    Other seborrheic keratosis    Osteopenia    Long-term use of immunosuppressant medication    Hypomagnesemia    Hypertension    Benign essential hypertension    Chronic rhinitis    Common migraine without aura    Cystic breast    Fibrocystic disease of right breast    Hammer toe of left foot    Melanocytic nevi of trunk    Melanocytic nevi of unspecified part of face            Meds    Current Outpatient Medications:     alendronate (Fosamax) 70 mg tablet, Take 1 tablet (70 mg) by mouth 1 (one) time per week., Disp: , Rfl:     atenolol (Tenormin) 25 mg tablet, Take 1 tablet (25 mg) by mouth once daily., Disp: , Rfl:     ciprofloxacin-dexamethasone (Ciprodex) otic suspension, Administer 4 drops into each ear 2 times a day. For up to 2 weeks as needed for scaling/itching, Disp: 7.5 mL, Rfl: 3    dapsone 100 mg tablet, Take 1 tablet (100 mg) by mouth 3 times a week., Disp: , Rfl:     eletriptan (Relpax) 20 mg tablet, Take 1 tablet (20 mg) by mouth 1 time if needed (Migranes)., Disp: , Rfl:     fluticasone (Flonase) 50 mcg/actuation nasal spray, Administer 1 spray into each nostril once daily. Shake gently. Before first use, prime  pump. After use, clean tip and replace cap., Disp: , Rfl:     folic acid (Folvite) 1 mg tablet, TAKE 1 TABLET DAILY, Disp: 90 tablet, Rfl: 3    losartan (Cozaar) 100 mg tablet, Take 1 tablet (100 mg) by mouth once daily., Disp: 90 tablet, Rfl: 3    losartan (Cozaar) 50 mg tablet, Take 2 tablets (100 mg) by mouth once daily., Disp: , Rfl:     Moderna COVID Bival,6m up,,PF, 50 mcg/0.5 mL suspension vaccine, Inject 0.5 mL (50 mcg) into the muscle 1 time., Disp: , Rfl:     mycophenolate (Cellcept) 250 mg capsule, Take 5 capsules (1,250 mg) by mouth 2 times a day., Disp: 600 capsule, Rfl: 11    tacrolimus (Prograf) 1 mg capsule, Take 2 capsules (2 mg) by mouth 2 times a day., Disp: , Rfl:     valACYclovir (Valtrex) 500 mg tablet, TAKE 1 TABLET DAILY, Disp: 90 tablet, Rfl: 3     Physical Exam  Physical Exam  Constitutional:       Appearance: Normal appearance.   HENT:      Head: Normocephalic.   Cardiovascular:      Rate and Rhythm: Normal rate and regular rhythm.      Heart sounds: Normal heart sounds.   Pulmonary:      Breath sounds: Normal breath sounds.   Abdominal:      General: Bowel sounds are normal.      Palpations: Abdomen is soft.   Musculoskeletal:      Right lower leg: No edema.      Left lower leg: No edema.   Skin:     Findings: No rash.   Psychiatric:         Mood and Affect: Mood normal.         Judgment: Judgment normal.          Review of Systems  Review of Systems   Constitutional: Negative.    HENT: Negative.     Eyes: Negative.    Respiratory: Negative.     Cardiovascular: Negative.    Gastrointestinal: Negative.    Endocrine: Negative.    Genitourinary: Negative.    Musculoskeletal: Negative.    Skin: Negative.    Allergic/Immunologic: Negative.    Neurological: Negative.    Hematological: Negative.    Psychiatric/Behavioral: Negative.     All other systems reviewed and are negative.      Lab Results  Component      Latest Ref Rn 11/13/2023   WBC      4.4 - 11.3 x10*3/uL 5.8    nRBC      0.0 - 0.0  /100 WBCs 0.0    RBC      4.00 - 5.20 x10*6/uL 3.23 (L)    HEMOGLOBIN      12.0 - 16.0 g/dL 12.2    HEMATOCRIT      36.0 - 46.0 % 36.0    MCV      80 - 100 fL 112 (H)    MCHC      32.0 - 36.0 g/dL 33.9    Platelets      150 - 450 x10*3/uL 190    RED CELL DISTRIBUTION WIDTH      11.5 - 14.5 % 12.3    Neutrophils %      40.0 - 80.0 % 52.9    Immature Granulocytes %, Automated      0.0 - 0.9 % 1.0 (H)    Lymphocytes %      13.0 - 44.0 % 32.9    Monocytes %      2.0 - 10.0 % 12.7    Eosinophils %      0.0 - 6.0 % 0.3    Basophils %      0.0 - 2.0 % 0.2    Neutrophils Absolute      1.20 - 7.70 x10*3/uL 3.08    Lymphocytes Absolute      1.20 - 4.80 x10*3/uL 1.92    Monocytes Absolute      0.10 - 1.00 x10*3/uL 0.74    Eosinophils Absolute      0.00 - 0.70 x10*3/uL 0.02    Basophils Absolute      0.00 - 0.10 x10*3/uL 0.01    MCH      26.0 - 34.0 pg 37.8 (H)    Immature Granulocytes Absolute, Automated      0.00 - 0.70 x10*3/uL 0.06       Component      Latest Ref [facility] 11/13/2023   GLUCOSE      74 - 99 mg/dL 106 (H)    SODIUM      136 - 145 mmol/L 131 (L)    POTASSIUM      3.5 - 5.3 mmol/L 3.8    CHLORIDE      98 - 107 mmol/L 97 (L)    Bicarbonate      21 - 32 mmol/L 27    Anion Gap      10 - 20 mmol/L 11    Blood Urea Nitrogen      6 - 23 mg/dL 25 (H)    Creatinine      0.50 - 1.05 mg/dL 1.01    Calcium      8.6 - 10.3 mg/dL 9.2    Albumin      3.4 - 5.0 g/dL 4.7    Alkaline Phosphatase      33 - 136 U/L 54    Total Protein      6.4 - 8.2 g/dL 6.9    AST      9 - 39 U/L 18    Bilirubin Total      0.0 - 1.2 mg/dL 0.7    ALT      7 - 45 U/L 17    EGFR      >60 mL/min/1.73m*2 64       Component      Latest Ref Rng 11/13/2023   Tacrolimus       <=15.0 ng/mL 6.8        Component      Latest Ref Rng 11/13/2023   Vitamin D, 25-Hydroxy, Total      30 - 100 ng/mL 47      IgG pending.    ASSESSMENT / PLAN:       1. Pulmonary-s/p DLTx on for Familial IPF [facility] 1999.     Allograft function:  -PFT's, stable without evidence of  CLAD    Immunosuppression  -Prograf, goal 5-7  -Cellcept 1000 mg BID  -Prednisone 5mg daily.    Infectious Prophylaxis  -Bactrim for PJP prophylaxis  -CMV -/+, continue Valcyte 450 mg daily, will continue to follow weekly CMV PCR's closely.  -Voriconazole 200 mg BID for anti-fungal prophylaxis, plan to continue for minimum of 4 month course, will continue to follow LFTs closely.    2) Cardiovascular  a. Hypertension - now under control, interval rebalancing of meds, now resolved with no edema.  b. Raynauds  c. ED visit 9/22 - vasovagal event. DC to home with no follow up required      3) Renal/  a. Clinically with euvolemic hyponatremia in past . Recent transient 126 associated with known salt restriction + high water intake.     4) GI    5) Endocrine - perimenopausal.    6) Neuropsych    7) Musculoskeletal    8) Heme-Onc    9) Prophylaxis/Health Maintenance  A. Vaccinations Influenza/Pneumococcal/COVID vaccines.   B. Cancer screening  1. Pap- 3/2023  2. Rafael- 3/2023  3. Colonoscopy- Done on 07/23/18. Recommended repeat in 10 years?  C. Dexa scan- Done on 1/12/22 due 1/22/2024  D. Dermatology: Will be seen in next few weeks.          Plans:  1) RTC in 12 weeks with PFT's, CXR.   2) 7 day Rx for azithromycin 250 mg daily sent to pharmacy.

## 2024-03-27 LAB
MGC ASCENT PFT - FEV1 - PRE: 1.99
MGC ASCENT PFT - FEV1 - PREDICTED: 2.63
MGC ASCENT PFT - FVC - PRE: 2.94
MGC ASCENT PFT - FVC - PREDICTED: 3.33

## 2024-04-02 ENCOUNTER — TELEPHONE (OUTPATIENT)
Dept: TRANSPLANT | Facility: HOSPITAL | Age: 59
End: 2024-04-02
Payer: COMMERCIAL

## 2024-04-02 NOTE — TELEPHONE ENCOUNTER
Called pharmacy regarding patients PA for eletriptan, they are in need of documentation, pharmacy to reach out to PCP for documentation. PA has been done and is currently being reviewed. Case number [id number]

## 2024-04-02 NOTE — TELEPHONE ENCOUNTER
Called patient regarding eletriptan, PA will not go through, patient advised to go to PCP to try and get medication filled. Patient verbalized understanding and stated she will go another route to get medication.

## 2024-05-07 DIAGNOSIS — Z94.2 LUNG TRANSPLANT RECIPIENT (MULTI): Primary | ICD-10-CM

## 2024-05-07 RX ORDER — PREDNISONE 1 MG/1
3 TABLET ORAL DAILY
Qty: 270 TABLET | Refills: 3 | Status: SHIPPED | OUTPATIENT
Start: 2024-05-07

## 2024-05-22 ENCOUNTER — LAB (OUTPATIENT)
Dept: LAB | Facility: LAB | Age: 59
End: 2024-05-22
Payer: COMMERCIAL

## 2024-05-22 DIAGNOSIS — Z94.2 LUNG TRANSPLANTED (MULTI): ICD-10-CM

## 2024-05-22 LAB
ALBUMIN SERPL BCP-MCNC: 4.2 G/DL (ref 3.4–5)
ALP SERPL-CCNC: 59 U/L (ref 33–136)
ALT SERPL W P-5'-P-CCNC: 14 U/L (ref 7–45)
ANION GAP SERPL CALC-SCNC: 13 MMOL/L (ref 10–20)
AST SERPL W P-5'-P-CCNC: 17 U/L (ref 9–39)
BASOPHILS # BLD AUTO: 0.01 X10*3/UL (ref 0–0.1)
BASOPHILS NFR BLD AUTO: 0.2 %
BILIRUB SERPL-MCNC: 0.6 MG/DL (ref 0–1.2)
BUN SERPL-MCNC: 17 MG/DL (ref 6–23)
CALCIUM SERPL-MCNC: 8.6 MG/DL (ref 8.6–10.6)
CHLORIDE SERPL-SCNC: 90 MMOL/L (ref 98–107)
CO2 SERPL-SCNC: 24 MMOL/L (ref 21–32)
CREAT SERPL-MCNC: 0.84 MG/DL (ref 0.5–1.05)
EGFRCR SERPLBLD CKD-EPI 2021: 79 ML/MIN/1.73M*2
EOSINOPHIL # BLD AUTO: 0.02 X10*3/UL (ref 0–0.7)
EOSINOPHIL NFR BLD AUTO: 0.3 %
ERYTHROCYTE [DISTWIDTH] IN BLOOD BY AUTOMATED COUNT: 11.9 % (ref 11.5–14.5)
GLUCOSE SERPL-MCNC: 100 MG/DL (ref 74–99)
HCT VFR BLD AUTO: 32.9 % (ref 36–46)
HGB BLD-MCNC: 11.6 G/DL (ref 12–16)
IMM GRANULOCYTES # BLD AUTO: 0.01 X10*3/UL (ref 0–0.7)
IMM GRANULOCYTES NFR BLD AUTO: 0.2 % (ref 0–0.9)
LYMPHOCYTES # BLD AUTO: 1.44 X10*3/UL (ref 1.2–4.8)
LYMPHOCYTES NFR BLD AUTO: 23.6 %
MAGNESIUM SERPL-MCNC: 1.61 MG/DL (ref 1.6–2.4)
MCH RBC QN AUTO: 37.5 PG (ref 26–34)
MCHC RBC AUTO-ENTMCNC: 35.3 G/DL (ref 32–36)
MCV RBC AUTO: 107 FL (ref 80–100)
MONOCYTES # BLD AUTO: 0.67 X10*3/UL (ref 0.1–1)
MONOCYTES NFR BLD AUTO: 11 %
NEUTROPHILS # BLD AUTO: 3.95 X10*3/UL (ref 1.2–7.7)
NEUTROPHILS NFR BLD AUTO: 64.7 %
NRBC BLD-RTO: 0 /100 WBCS (ref 0–0)
PLATELET # BLD AUTO: 189 X10*3/UL (ref 150–450)
POTASSIUM SERPL-SCNC: 4.4 MMOL/L (ref 3.5–5.3)
PROT SERPL-MCNC: 6.4 G/DL (ref 6.4–8.2)
RBC # BLD AUTO: 3.09 X10*6/UL (ref 4–5.2)
SODIUM SERPL-SCNC: 123 MMOL/L (ref 136–145)
WBC # BLD AUTO: 6.1 X10*3/UL (ref 4.4–11.3)

## 2024-05-22 PROCEDURE — 36415 COLL VENOUS BLD VENIPUNCTURE: CPT

## 2024-05-22 PROCEDURE — 80053 COMPREHEN METABOLIC PANEL: CPT

## 2024-05-22 PROCEDURE — 82784 ASSAY IGA/IGD/IGG/IGM EACH: CPT

## 2024-05-22 PROCEDURE — 80197 ASSAY OF TACROLIMUS: CPT

## 2024-05-22 PROCEDURE — 85025 COMPLETE CBC W/AUTO DIFF WBC: CPT

## 2024-05-22 PROCEDURE — 83735 ASSAY OF MAGNESIUM: CPT

## 2024-05-23 ENCOUNTER — PATIENT MESSAGE (OUTPATIENT)
Dept: TRANSPLANT | Facility: HOSPITAL | Age: 59
End: 2024-05-23
Payer: COMMERCIAL

## 2024-05-23 LAB
CMV DNA SERPL NAA+PROBE-LOG IU: ABNORMAL {LOG_IU}/ML
IGG SERPL-MCNC: 809 MG/DL (ref 700–1600)
LABORATORY COMMENT REPORT: ABNORMAL
TACROLIMUS BLD-MCNC: 5.8 NG/ML

## 2024-05-24 ENCOUNTER — DOCUMENTATION (OUTPATIENT)
Dept: TRANSPLANT | Facility: HOSPITAL | Age: 59
End: 2024-05-24
Payer: COMMERCIAL

## 2024-05-24 DIAGNOSIS — Z94.2 LUNG TRANSPLANTED (MULTI): ICD-10-CM

## 2024-05-24 NOTE — TELEPHONE ENCOUNTER
From: Carmen Robles  To: Nurse Stephanie CHAPMAN  Sent: 5/23/2024 8:48 AM EDT  Subject: Carmen robles labs    Good morning Stephanie. I see my iron and red blood cell count are both very low I’ve been experiencing a lot of fatigue lately temporarily under a lot of stress If there are any other tests that need to be run, please let me know. I’m gonna let my kidney doctor know about the low sodium.

## 2024-05-24 NOTE — PROGRESS NOTES
Labs reviewed on 5/24/24  WBC 6.1 ANC 3.95  Hbg 11.6  Platelets 189  Creatinine 0.84  Magnesium 1.61  Prograf 5.8 Goal 5-7 Dose 2mg BID    -Changes made: None, patient to reach out to nephrology regarding hyponatremia   -Labs due next 6/17    [employer] message sent to patient regarding labs and when to repeat.

## 2024-06-13 ENCOUNTER — TELEPHONE (OUTPATIENT)
Dept: TRANSPLANT | Facility: HOSPITAL | Age: 59
End: 2024-06-13
Payer: COMMERCIAL

## 2024-06-13 ENCOUNTER — TELEPHONE (OUTPATIENT)
Dept: RESPIRATORY THERAPY | Facility: HOSPITAL | Age: 59
End: 2024-06-13
Payer: COMMERCIAL

## 2024-06-13 DIAGNOSIS — Z94.2 LUNG TRANSPLANTED (MULTI): ICD-10-CM

## 2024-06-13 NOTE — TELEPHONE ENCOUNTER
Received call from patient, patient stated she woke on memorial day with a cold. Patient is still having a productive cough with clear sputum, denies fever, chills, SOB. Per Dr. Camacho, patient to get chest xray on 6/14 and will develop plan of care depending on those results. Patient verbalized understanding and stated she will go first thing in the morning.

## 2024-06-14 ENCOUNTER — HOSPITAL ENCOUNTER (OUTPATIENT)
Dept: RADIOLOGY | Facility: HOSPITAL | Age: 59
Discharge: HOME | End: 2024-06-14
Payer: COMMERCIAL

## 2024-06-14 ENCOUNTER — PATIENT MESSAGE (OUTPATIENT)
Dept: TRANSPLANT | Facility: HOSPITAL | Age: 59
End: 2024-06-14
Payer: COMMERCIAL

## 2024-06-14 DIAGNOSIS — Z94.2 LUNG TRANSPLANTED (MULTI): ICD-10-CM

## 2024-06-14 PROCEDURE — 71046 X-RAY EXAM CHEST 2 VIEWS: CPT

## 2024-06-19 ENCOUNTER — TELEPHONE (OUTPATIENT)
Dept: TRANSPLANT | Facility: HOSPITAL | Age: 59
End: 2024-06-19

## 2024-06-19 ENCOUNTER — LAB (OUTPATIENT)
Dept: LAB | Facility: LAB | Age: 59
End: 2024-06-19
Payer: COMMERCIAL

## 2024-06-19 DIAGNOSIS — Z94.2 LUNG TRANSPLANTED (MULTI): Primary | ICD-10-CM

## 2024-06-19 DIAGNOSIS — Z94.2 LUNG TRANSPLANTED (MULTI): ICD-10-CM

## 2024-06-19 LAB
25(OH)D3 SERPL-MCNC: 40 NG/ML (ref 30–100)
ALBUMIN SERPL BCP-MCNC: 4.8 G/DL (ref 3.4–5)
ALP SERPL-CCNC: 55 U/L (ref 33–136)
ALT SERPL W P-5'-P-CCNC: 17 U/L (ref 7–45)
ANION GAP SERPL CALC-SCNC: 14 MMOL/L (ref 10–20)
AST SERPL W P-5'-P-CCNC: 19 U/L (ref 9–39)
BASOPHILS # BLD AUTO: 0.01 X10*3/UL (ref 0–0.1)
BASOPHILS NFR BLD AUTO: 0.2 %
BILIRUB SERPL-MCNC: 0.9 MG/DL (ref 0–1.2)
BUN SERPL-MCNC: 16 MG/DL (ref 6–23)
CALCIUM SERPL-MCNC: 9.2 MG/DL (ref 8.6–10.3)
CHLORIDE SERPL-SCNC: 95 MMOL/L (ref 98–107)
CHOLEST SERPL-MCNC: 211 MG/DL (ref 0–199)
CHOLESTEROL/HDL RATIO: 2.7
CO2 SERPL-SCNC: 26 MMOL/L (ref 21–32)
CREAT SERPL-MCNC: 0.72 MG/DL (ref 0.5–1.05)
EGFRCR SERPLBLD CKD-EPI 2021: >90 ML/MIN/1.73M*2
EOSINOPHIL # BLD AUTO: 0.05 X10*3/UL (ref 0–0.7)
EOSINOPHIL NFR BLD AUTO: 1 %
ERYTHROCYTE [DISTWIDTH] IN BLOOD BY AUTOMATED COUNT: 12.5 % (ref 11.5–14.5)
GLUCOSE SERPL-MCNC: 89 MG/DL (ref 74–99)
HCT VFR BLD AUTO: 37.7 % (ref 36–46)
HDLC SERPL-MCNC: 77.7 MG/DL
HGB BLD-MCNC: 13.5 G/DL (ref 12–16)
IGG SERPL-MCNC: 834 MG/DL (ref 700–1600)
IMM GRANULOCYTES # BLD AUTO: 0.02 X10*3/UL (ref 0–0.7)
IMM GRANULOCYTES NFR BLD AUTO: 0.4 % (ref 0–0.9)
LDLC SERPL CALC-MCNC: 120 MG/DL
LYMPHOCYTES # BLD AUTO: 1.64 X10*3/UL (ref 1.2–4.8)
LYMPHOCYTES NFR BLD AUTO: 31.5 %
MAGNESIUM SERPL-MCNC: 1.76 MG/DL (ref 1.6–2.4)
MCH RBC QN AUTO: 38.9 PG (ref 26–34)
MCHC RBC AUTO-ENTMCNC: 35.8 G/DL (ref 32–36)
MCV RBC AUTO: 109 FL (ref 80–100)
MONOCYTES # BLD AUTO: 0.71 X10*3/UL (ref 0.1–1)
MONOCYTES NFR BLD AUTO: 13.7 %
NEUTROPHILS # BLD AUTO: 2.77 X10*3/UL (ref 1.2–7.7)
NEUTROPHILS NFR BLD AUTO: 53.2 %
NON HDL CHOLESTEROL: 133 MG/DL (ref 0–149)
NRBC BLD-RTO: 0 /100 WBCS (ref 0–0)
PLATELET # BLD AUTO: 193 X10*3/UL (ref 150–450)
POTASSIUM SERPL-SCNC: 4 MMOL/L (ref 3.5–5.3)
PROT SERPL-MCNC: 7 G/DL (ref 6.4–8.2)
RBC # BLD AUTO: 3.47 X10*6/UL (ref 4–5.2)
SODIUM SERPL-SCNC: 131 MMOL/L (ref 136–145)
TACROLIMUS BLD-MCNC: 5.9 NG/ML
TRIGL SERPL-MCNC: 68 MG/DL (ref 0–149)
VLDL: 14 MG/DL (ref 0–40)
WBC # BLD AUTO: 5.2 X10*3/UL (ref 4.4–11.3)

## 2024-06-19 PROCEDURE — 83735 ASSAY OF MAGNESIUM: CPT

## 2024-06-19 PROCEDURE — 86833 HLA CLASS II HIGH DEFIN QUAL: CPT

## 2024-06-19 PROCEDURE — 86832 HLA CLASS I HIGH DEFIN QUAL: CPT

## 2024-06-19 PROCEDURE — 82784 ASSAY IGA/IGD/IGG/IGM EACH: CPT

## 2024-06-19 PROCEDURE — 80053 COMPREHEN METABOLIC PANEL: CPT

## 2024-06-19 PROCEDURE — 80061 LIPID PANEL: CPT

## 2024-06-19 PROCEDURE — 85025 COMPLETE CBC W/AUTO DIFF WBC: CPT

## 2024-06-19 PROCEDURE — 36415 COLL VENOUS BLD VENIPUNCTURE: CPT

## 2024-06-19 PROCEDURE — 80197 ASSAY OF TACROLIMUS: CPT

## 2024-06-19 PROCEDURE — 82306 VITAMIN D 25 HYDROXY: CPT

## 2024-06-19 NOTE — TELEPHONE ENCOUNTER
Labs reviewed on 6/19/24  WBC 5.2 ANC 2.77  Hbg 13.5  Platelets 193  Creatinine 0.72  Magnesium 1.76  Prograf 5.9 Goal 5-7 Dose 2mg BID     -Changes made: None  -Labs due next 7/22    Called and spoke with patient regarding lab and when to repeat labs, patient verbalized understanding. Patient sounded hoarse and stated that she is still having a nonproductive cough. Will be coming to clinic on 6/24

## 2024-06-20 LAB
CMV DNA SERPL NAA+PROBE-LOG IU: NORMAL {LOG_IU}/ML
HLA RESULTS: NORMAL
HLA-A+B+C AB NFR SER: NORMAL %
HLA-DP+DQ+DR AB NFR SER: NORMAL %
LABORATORY COMMENT REPORT: NOT DETECTED

## 2024-06-25 ENCOUNTER — HOSPITAL ENCOUNTER (OUTPATIENT)
Dept: RESPIRATORY THERAPY | Facility: HOSPITAL | Age: 59
Discharge: HOME | End: 2024-06-25
Payer: COMMERCIAL

## 2024-06-25 ENCOUNTER — OFFICE VISIT (OUTPATIENT)
Dept: TRANSPLANT | Facility: HOSPITAL | Age: 59
End: 2024-06-25
Payer: COMMERCIAL

## 2024-06-25 ENCOUNTER — SOCIAL WORK (OUTPATIENT)
Dept: TRANSPLANT | Facility: HOSPITAL | Age: 59
End: 2024-06-25
Payer: COMMERCIAL

## 2024-06-25 VITALS
DIASTOLIC BLOOD PRESSURE: 79 MMHG | BODY MASS INDEX: 26.78 KG/M2 | SYSTOLIC BLOOD PRESSURE: 131 MMHG | OXYGEN SATURATION: 97 % | TEMPERATURE: 97.2 F | HEART RATE: 64 BPM | WEIGHT: 171 LBS

## 2024-06-25 DIAGNOSIS — Z94.2 LUNG TRANSPLANTED (MULTI): ICD-10-CM

## 2024-06-25 DIAGNOSIS — E83.42 HYPOMAGNESEMIA: ICD-10-CM

## 2024-06-25 DIAGNOSIS — Z79.60 LONG-TERM USE OF IMMUNOSUPPRESSANT MEDICATION: ICD-10-CM

## 2024-06-25 DIAGNOSIS — Z94.2 HISTORY OF LUNG TRANSPLANT (MULTI): Primary | ICD-10-CM

## 2024-06-25 PROCEDURE — 94010 BREATHING CAPACITY TEST: CPT

## 2024-06-25 PROCEDURE — 3075F SYST BP GE 130 - 139MM HG: CPT | Performed by: INTERNAL MEDICINE

## 2024-06-25 PROCEDURE — 3078F DIAST BP <80 MM HG: CPT | Performed by: INTERNAL MEDICINE

## 2024-06-25 PROCEDURE — 99215 OFFICE O/P EST HI 40 MIN: CPT | Performed by: INTERNAL MEDICINE

## 2024-06-25 PROCEDURE — 94010 BREATHING CAPACITY TEST: CPT | Performed by: INTERNAL MEDICINE

## 2024-06-25 RX ORDER — DAPSONE 100 MG/1
TABLET ORAL
Qty: 40 TABLET | Refills: 3 | Status: SHIPPED | OUTPATIENT
Start: 2024-06-25

## 2024-06-25 RX ORDER — ALBUTEROL SULFATE 90 UG/1
2 AEROSOL, METERED RESPIRATORY (INHALATION) EVERY 6 HOURS PRN
Qty: 18 G | Refills: 11 | Status: SHIPPED | OUTPATIENT
Start: 2024-06-25 | End: 2025-06-25

## 2024-06-25 ASSESSMENT — ENCOUNTER SYMPTOMS
SPUTUM PRODUCTION: 1
EYES NEGATIVE: 1
PALPITATIONS: 0
HEADACHES: 0
HOARSE VOICE: 1
HEMATOLOGIC/LYMPHATIC NEGATIVE: 1
PSYCHIATRIC NEGATIVE: 1
NUMBNESS: 0
DIARRHEA: 0
COUGH: 1
ABDOMINAL PAIN: 0
WEIGHT LOSS: 0
BLOATING: 0
BLURRED VISION: 0
PARESTHESIAS: 0
ALLERGIC/IMMUNOLOGIC NEGATIVE: 1
MUSCULOSKELETAL NEGATIVE: 1
SUSPICIOUS LESIONS: 0
CARDIOVASCULAR NEGATIVE: 1
NAUSEA: 0
EYE PAIN: 0
LIGHT-HEADEDNESS: 0
GASTROINTESTINAL NEGATIVE: 1
FEVER: 0
RESPIRATORY NEGATIVE: 1
RIGHT EYE: 0
INSOMNIA: 0
NEUROLOGICAL NEGATIVE: 1
LEFT EYE: 0
DOUBLE VISION: 0
FALLS: 0
SORE THROAT: 1
WEIGHT GAIN: 0
DECREASED APPETITE: 0
DEPRESSION: 0
ENDOCRINE NEGATIVE: 1
VOMITING: 0
CONSTIPATION: 0
JOINT SWELLING: 0
CONSTITUTIONAL NEGATIVE: 1
CHILLS: 0

## 2024-06-25 ASSESSMENT — ANXIETY QUESTIONNAIRES
2. NOT BEING ABLE TO STOP OR CONTROL WORRYING: NOT AT ALL
7. FEELING AFRAID AS IF SOMETHING AWFUL MIGHT HAPPEN: NOT AT ALL
3. WORRYING TOO MUCH ABOUT DIFFERENT THINGS: SEVERAL DAYS
5. BEING SO RESTLESS THAT IT IS HARD TO SIT STILL: NOT AT ALL
GAD7 TOTAL SCORE: 2
6. BECOMING EASILY ANNOYED OR IRRITABLE: NOT AT ALL
1. FEELING NERVOUS, ANXIOUS, OR ON EDGE: NOT AT ALL
4. TROUBLE RELAXING: SEVERAL DAYS

## 2024-06-25 ASSESSMENT — PAIN SCALES - GENERAL: PAINLEVEL: 0-NO PAIN

## 2024-06-25 ASSESSMENT — PATIENT HEALTH QUESTIONNAIRE - PHQ9
6. FEELING BAD ABOUT YOURSELF - OR THAT YOU ARE A FAILURE OR HAVE LET YOURSELF OR YOUR FAMILY DOWN: NOT AT ALL
3. TROUBLE FALLING OR STAYING ASLEEP OR SLEEPING TOO MUCH: NOT AT ALL
7. TROUBLE CONCENTRATING ON THINGS, SUCH AS READING THE NEWSPAPER OR WATCHING TELEVISION: NOT AT ALL
SUM OF ALL RESPONSES TO PHQ9 QUESTIONS 1 & 2: 0
5. POOR APPETITE OR OVEREATING: NOT AT ALL
9. THOUGHTS THAT YOU WOULD BE BETTER OFF DEAD, OR OF HURTING YOURSELF: NOT AT ALL
1. LITTLE INTEREST OR PLEASURE IN DOING THINGS: NOT AT ALL
4. FEELING TIRED OR HAVING LITTLE ENERGY: NOT AT ALL
2. FEELING DOWN, DEPRESSED OR HOPELESS: NOT AT ALL
8. MOVING OR SPEAKING SO SLOWLY THAT OTHER PEOPLE COULD HAVE NOTICED. OR THE OPPOSITE, BEING SO FIGETY OR RESTLESS THAT YOU HAVE BEEN MOVING AROUND A LOT MORE THAN USUAL: NOT AT ALL
SUM OF ALL RESPONSES TO PHQ QUESTIONS 1-9: 0

## 2024-06-25 NOTE — PROGRESS NOTES
"Review of Systems   Constitutional: Negative for chills, decreased appetite, fever, weight gain and weight loss.   HENT:  Positive for congestion, hoarse voice and sore throat.         Patient is congested in the morning and \"feels heaviness\" in her head and upper chest area in the morning mostly. Patient explains she is more hoarse and has a sore throat from coughing.   Eyes:  Negative for blurred vision, double vision, pain, vision loss in left eye and vision loss in right eye.   Cardiovascular:  Negative for chest pain, leg swelling and palpitations.   Respiratory:  Positive for cough and sputum production.         Has had cough since memorial day weekend that won't go away. Patient takes Claritin for seasonal allergies, she states her allergies may play a role in the cough.   Skin:  Negative for dry skin, itching, rash and suspicious lesions.   Musculoskeletal:  Negative for falls, joint pain and joint swelling.   Gastrointestinal:  Negative for bloating, abdominal pain, constipation, diarrhea, melena, nausea and vomiting.   Genitourinary: Negative.    Neurological:  Negative for headaches, light-headedness, numbness and paresthesias.   Psychiatric/Behavioral:  Negative for depression. The patient does not have insomnia.      Patient states she needs to make an appointment with her neurologist for her headache medication can be refilled. She has a cough that does not seem to go away since Memorial Day, no other symptoms reported.   "

## 2024-06-25 NOTE — TELEPHONE ENCOUNTER
Returned patients call regarding medication that Dr. Tan informed patient to call insurance. Albuterol inhaler is covered with patients insurance, script placed and sent to [facility].

## 2024-06-25 NOTE — PROGRESS NOTES
Chief Complaint:  59 y.o.  y/o M with history of s/p LTx . who was last seen on  and presents today for: Follow-up transplant.       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 2.64 P   FVC - PRE 3.02 P   FEV1 - Pre 1.99        Current Immunosuppression:   -Prograf, Cellcept, Prednisone    Interval Medical History:  Last seen 3/26/2024. Since his last visit, no complaints.     Patient Active Problem List   Diagnosis    Transplant    History of lung transplant (Multi)    Status post right breast biopsy    Plantar fascial fibromatosis    Pain in both feet    Other seborrheic keratosis    Osteopenia    Long-term use of immunosuppressant medication    Hypomagnesemia    Hypertension    Benign essential hypertension    Chronic rhinitis    Common migraine without aura    Cystic breast    Fibrocystic disease of right breast    Hammer toe of left foot    Melanocytic nevi of trunk    Melanocytic nevi of unspecified part of face            Meds    Current Outpatient Medications:     alendronate (Fosamax) 70 mg tablet, Take 1 tablet (70 mg) by mouth 1 (one) time per week., Disp: , Rfl:     atenolol (Tenormin) 25 mg tablet, Take 1 tablet (25 mg) by mouth once daily., Disp: , Rfl:     azithromycin (Zithromax) 250 mg tablet, Take 1 tablet (250 mg) by mouth once daily., Disp: , Rfl:     azithromycin (Zithromax) 250 mg tablet, Take 1 tablet (250 mg) by mouth once daily., Disp: 90 tablet, Rfl: 3    dapsone 100 mg tablet, Take 1 tablet (100 mg) by mouth 3 times a week., Disp: , Rfl:     eletriptan (Relpax) 20 mg tablet, Take 1 tablet (20 mg) by mouth 1 time if needed for migraine (Migranes)., Disp: 6 tablet, Rfl: 3    fluticasone (Flonase) 50 mcg/actuation nasal spray, Administer 1 spray into each nostril once daily. Shake gently. Before first use,  prime pump. After use, clean tip and replace cap., Disp: , Rfl:     folic acid (Folvite) 1 mg tablet, TAKE 1 TABLET DAILY, Disp: 90 tablet, Rfl: 3    losartan (Cozaar) 100 mg tablet, Take 1 tablet (100 mg) by mouth once daily., Disp: 90 tablet, Rfl: 3    mycophenolate (Cellcept) 250 mg capsule, Take 5 capsules (1,250 mg) by mouth 2 times a day., Disp: 600 capsule, Rfl: 11    predniSONE (Deltasone) 1 mg tablet, TAKE 3 TABLETS DAILY, Disp: 270 tablet, Rfl: 3    tacrolimus (Prograf) 1 mg capsule, Take 2 capsules (2 mg) by mouth 2 times a day., Disp: , Rfl:     valACYclovir (Valtrex) 500 mg tablet, TAKE 1 TABLET DAILY, Disp: 90 tablet, Rfl: 3     Physical Exam  Physical Exam  Constitutional:       Appearance: Normal appearance.   HENT:      Head: Normocephalic.   Cardiovascular:      Rate and Rhythm: Normal rate and regular rhythm.      Heart sounds: Normal heart sounds.   Pulmonary:      Breath sounds: Normal breath sounds.   Abdominal:      General: Bowel sounds are normal.      Palpations: Abdomen is soft.   Musculoskeletal:      Right lower leg: No edema.      Left lower leg: No edema.   Skin:     Findings: No rash.   Psychiatric:         Mood and Affect: Mood normal.         Judgment: Judgment normal.        Review of Systems  Review of Systems   Constitutional: Negative.    HENT: Negative.     Eyes: Negative.    Respiratory: Negative.     Cardiovascular: Negative.    Gastrointestinal: Negative.    Endocrine: Negative.    Genitourinary: Negative.    Musculoskeletal: Negative.    Skin: Negative.    Allergic/Immunologic: Negative.    Neurological: Negative.    Hematological: Negative.    Psychiatric/Behavioral: Negative.     All other systems reviewed and are negative.      Lab Results  Tac = 5.9    Cholesterol 211, LDL = 120    Na = 131    Rest labs normal.       ASSESSMENT / PLAN:       1. Pulmonary-s/p DLTx on for Familial IPF [place] 1999.     Allograft function:  -PFT's, stable without evidence of  CLAD    Immunosuppression  -Prograf, goal 5-7  -Cellcept 1000 mg BID  -Prednisone 5mg daily.    Infectious Prophylaxis  -Bactrim for PJP prophylaxis  -CMV -/+, continue Valcyte 450 mg daily, will continue to follow weekly CMV PCR's closely.  -Voriconazole 200 mg BID for anti-fungal prophylaxis, plan to continue for minimum of 4 month course, will continue to follow LFTs closely.    Decreased PFTs today symmetric with patient coughing and not able to take full breath, overall much better than 2 weeks ago with first cxr. Will trial albuterol for post bronchitis airway hyper-reactivity.     2) Cardiovascular  a. Hypertension - now under control, interval rebalancing of meds, now resolved with no edema.  b. Raynauds  c. ED visit 9/22 - vasovagal event. DC to home with no follow up required      3) Renal/  a. Clinically with euvolemic hyponatremia in past . Recent transient 126 associated with known salt restriction + high water intake.     4) GI    5) Endocrine - perimenopausal.    6) Neuropsych    7) Musculoskeletal    8) Heme-Onc    9) Prophylaxis/Health Maintenance  A. Vaccinations Influenza/Pneumococcal/COVID vaccines.   B. Cancer screening  1. Pap- 3/2023  2. Rafael- 3/2023  3. Colonoscopy- Done on 07/23/18. Recommended repeat in 10 years?  C. Dexa scan- Done on 1/12/22 due 1/22/2024  D. Dermatology: Will be seen in next few weeks.          Plans:  1) 2 weeks PFT with review, no need for clinic visit at that point if doing well. Weekly check in  2) Trial of short acting beta agonist for cough, patient will call later today for specific insurance approved brand.   3) Plan for routine 3 month RTC with cxr, cr and interval routine labs (assuming continued resolution and interval recovered cr)

## 2024-06-25 NOTE — PATIENT INSTRUCTIONS
-Please bring a list of your medications to every office visit.     -We will schedule you a follow up visit. We will call you with this date.     -Thank you for visiting the  [facility] today! For issues during business hours, please call [phone], or send a [employer] message to your coordinator. For urgent issues after hours, please call [phone].

## 2024-06-25 NOTE — PROGRESS NOTES
SW met with pt in [place] [address] for a follow up appt. Pt was active and engaged during visit. Pt confirmed that her information remains the same including address, phone, and insurance. Pt shard she has been doing well at home. Pt stated her primary support people are her son and his wife, her boyfriend and his son and she has close friends she can rely on when necessary. Pt shared her mood is overall okay, working thru some lite challenges with family support and work has been a bit stressful but nothing too out of the ordinary. Pt shared she enjoys going for walks, being outside, looking at flowers and spending time with her family and grandchildren, especially on those challenging days. SW had pt complete PHQ-9 and MIRZA. Pt scored a 0 on PHQ-9 and 2 on MIRZA resulting in no clinical depression or anxiety symptoms at this time. SW provided encouragement and support to pt, sharing that if pt is ever interested in the future, we can help set up counseling services. Pt confirmed she has been doing well with her medications at home, has a routine, fills her pill box on Sundays. Pt denied any substance use or recent er visits. SW confirmed with pt that she did not have any other questions or concerns at this time. SW provided contact information to pt, if she would like to talk further or learn about counseling options in the future, the team can assist. SW will follow up with pt in a year or sooner as needed.     Sylvia COSTELLO

## 2024-06-26 ENCOUNTER — APPOINTMENT (OUTPATIENT)
Dept: TRANSPLANT | Facility: HOSPITAL | Age: 59
End: 2024-06-26
Payer: COMMERCIAL

## 2024-06-26 ENCOUNTER — APPOINTMENT (OUTPATIENT)
Dept: RESPIRATORY THERAPY | Facility: HOSPITAL | Age: 59
End: 2024-06-26
Payer: COMMERCIAL

## 2024-06-29 LAB
MGC ASCENT PFT - FEV1 - PRE: 1.97
MGC ASCENT PFT - FEV1 - PREDICTED: 2.52
MGC ASCENT PFT - FVC - PRE: 2.94
MGC ASCENT PFT - FVC - PREDICTED: 3.19

## 2024-07-05 ENCOUNTER — TELEPHONE (OUTPATIENT)
Dept: SCHEDULING | Age: 59
End: 2024-07-05
Payer: COMMERCIAL

## 2024-07-22 ENCOUNTER — LAB (OUTPATIENT)
Dept: LAB | Facility: LAB | Age: 59
End: 2024-07-22
Payer: COMMERCIAL

## 2024-07-22 ENCOUNTER — HOSPITAL ENCOUNTER (OUTPATIENT)
Dept: RESPIRATORY THERAPY | Facility: HOSPITAL | Age: 59
Discharge: HOME | End: 2024-07-22
Payer: COMMERCIAL

## 2024-07-22 DIAGNOSIS — Z94.2 HISTORY OF LUNG TRANSPLANT (MULTI): ICD-10-CM

## 2024-07-22 DIAGNOSIS — Z94.2 LUNG TRANSPLANTED (MULTI): ICD-10-CM

## 2024-07-22 LAB
ALBUMIN SERPL BCP-MCNC: 4.4 G/DL (ref 3.4–5)
ALP SERPL-CCNC: 49 U/L (ref 33–136)
ALT SERPL W P-5'-P-CCNC: 15 U/L (ref 7–45)
ANION GAP SERPL CALC-SCNC: 10 MMOL/L (ref 10–20)
AST SERPL W P-5'-P-CCNC: 16 U/L (ref 9–39)
BASOPHILS # BLD AUTO: 0.01 X10*3/UL (ref 0–0.1)
BASOPHILS NFR BLD AUTO: 0.2 %
BILIRUB SERPL-MCNC: 0.7 MG/DL (ref 0–1.2)
BUN SERPL-MCNC: 20 MG/DL (ref 6–23)
CALCIUM SERPL-MCNC: 9.1 MG/DL (ref 8.6–10.3)
CHLORIDE SERPL-SCNC: 100 MMOL/L (ref 98–107)
CO2 SERPL-SCNC: 27 MMOL/L (ref 21–32)
CREAT SERPL-MCNC: 0.78 MG/DL (ref 0.5–1.05)
EGFRCR SERPLBLD CKD-EPI 2021: 87 ML/MIN/1.73M*2
EOSINOPHIL # BLD AUTO: 0.05 X10*3/UL (ref 0–0.7)
EOSINOPHIL NFR BLD AUTO: 0.9 %
ERYTHROCYTE [DISTWIDTH] IN BLOOD BY AUTOMATED COUNT: 12.5 % (ref 11.5–14.5)
GLUCOSE SERPL-MCNC: 139 MG/DL (ref 74–99)
HCT VFR BLD AUTO: 34.6 % (ref 36–46)
HGB BLD-MCNC: 11.8 G/DL (ref 12–16)
IMM GRANULOCYTES # BLD AUTO: 0.01 X10*3/UL (ref 0–0.7)
IMM GRANULOCYTES NFR BLD AUTO: 0.2 % (ref 0–0.9)
LYMPHOCYTES # BLD AUTO: 1.94 X10*3/UL (ref 1.2–4.8)
LYMPHOCYTES NFR BLD AUTO: 36.7 %
MAGNESIUM SERPL-MCNC: 1.71 MG/DL (ref 1.6–2.4)
MCH RBC QN AUTO: 38.4 PG (ref 26–34)
MCHC RBC AUTO-ENTMCNC: 34.1 G/DL (ref 32–36)
MCV RBC AUTO: 113 FL (ref 80–100)
MONOCYTES # BLD AUTO: 0.71 X10*3/UL (ref 0.1–1)
MONOCYTES NFR BLD AUTO: 13.4 %
NEUTROPHILS # BLD AUTO: 2.57 X10*3/UL (ref 1.2–7.7)
NEUTROPHILS NFR BLD AUTO: 48.6 %
NRBC BLD-RTO: 0 /100 WBCS (ref 0–0)
PLATELET # BLD AUTO: 187 X10*3/UL (ref 150–450)
POTASSIUM SERPL-SCNC: 4.1 MMOL/L (ref 3.5–5.3)
PROT SERPL-MCNC: 6.3 G/DL (ref 6.4–8.2)
RBC # BLD AUTO: 3.07 X10*6/UL (ref 4–5.2)
RBC MORPH BLD: NORMAL
SODIUM SERPL-SCNC: 133 MMOL/L (ref 136–145)
TACROLIMUS BLD-MCNC: 4.7 NG/ML
WBC # BLD AUTO: 5.3 X10*3/UL (ref 4.4–11.3)

## 2024-07-22 PROCEDURE — 85060 BLOOD SMEAR INTERPRETATION: CPT | Performed by: INTERNAL MEDICINE

## 2024-07-22 PROCEDURE — 87799 DETECT AGENT NOS DNA QUANT: CPT

## 2024-07-22 PROCEDURE — 94010 BREATHING CAPACITY TEST: CPT | Performed by: INTERNAL MEDICINE

## 2024-07-22 PROCEDURE — 80197 ASSAY OF TACROLIMUS: CPT

## 2024-07-22 PROCEDURE — 94010 BREATHING CAPACITY TEST: CPT

## 2024-07-22 PROCEDURE — 36415 COLL VENOUS BLD VENIPUNCTURE: CPT

## 2024-07-22 PROCEDURE — 82784 ASSAY IGA/IGD/IGG/IGM EACH: CPT

## 2024-07-22 PROCEDURE — 80053 COMPREHEN METABOLIC PANEL: CPT

## 2024-07-22 PROCEDURE — 83735 ASSAY OF MAGNESIUM: CPT

## 2024-07-22 PROCEDURE — 85025 COMPLETE CBC W/AUTO DIFF WBC: CPT

## 2024-07-22 RX ORDER — FLUTICASONE PROPIONATE 50 MCG
1 SPRAY, SUSPENSION (ML) NASAL DAILY
Qty: 144 G | Refills: 5 | Status: SHIPPED | OUTPATIENT
Start: 2024-07-22

## 2024-07-23 ENCOUNTER — TELEPHONE (OUTPATIENT)
Dept: TRANSPLANT | Facility: HOSPITAL | Age: 59
End: 2024-07-23
Payer: COMMERCIAL

## 2024-07-23 DIAGNOSIS — Z94.2 LUNG TRANSPLANTED (MULTI): ICD-10-CM

## 2024-07-23 LAB
CMV DNA SERPL NAA+PROBE-LOG IU: NORMAL {LOG_IU}/ML
EBV DNA SPEC NAA+PROBE-LOG#: NORMAL {LOG_COPIES}/ML
IGG SERPL-MCNC: 904 MG/DL (ref 700–1600)
LABORATORY COMMENT REPORT: NOT DETECTED
LABORATORY COMMENT REPORT: NOT DETECTED
PATH REVIEW-CBC DIFFERENTIAL: NORMAL

## 2024-07-23 NOTE — TELEPHONE ENCOUNTER
Called patient about labs, patient needs to repeat tacrolimus level. Patient takes medication at 4:30 AM and 4:30 PM and states she was late to get labs, she will repeat level on Thursday. Patient stated that after she was prescribed the albuterol inhaler he cough has significantly improved, no other concerns at this time.

## 2024-07-25 ENCOUNTER — LAB (OUTPATIENT)
Dept: LAB | Facility: LAB | Age: 59
End: 2024-07-25
Payer: COMMERCIAL

## 2024-07-25 DIAGNOSIS — Z94.2 LUNG TRANSPLANTED (MULTI): ICD-10-CM

## 2024-07-25 PROCEDURE — 80197 ASSAY OF TACROLIMUS: CPT

## 2024-07-25 PROCEDURE — 36415 COLL VENOUS BLD VENIPUNCTURE: CPT

## 2024-07-26 LAB — TACROLIMUS BLD-MCNC: 5.7 NG/ML

## 2024-07-29 ENCOUNTER — DOCUMENTATION (OUTPATIENT)
Dept: TRANSPLANT | Facility: HOSPITAL | Age: 59
End: 2024-07-29
Payer: COMMERCIAL

## 2024-07-29 DIAGNOSIS — Z94.2 LUNG TRANSPLANTED (MULTI): ICD-10-CM

## 2024-07-29 NOTE — PROGRESS NOTES
Labs reviewed on 7/29/24  WBC 5.3 ANC 2.57  Hbg 11.8  Platelets 187  Creatinine 0.78  Magnesium 1.71  Prograf 5.7 Goal 5-7 Dose 2mg BOD    -Changes made: None  -Labs due next 8/26

## 2024-08-03 DIAGNOSIS — Z94.2 LUNG TRANSPLANTED (MULTI): Primary | ICD-10-CM

## 2024-08-04 RX ORDER — TACROLIMUS 1 MG/1
2 CAPSULE ORAL EVERY 12 HOURS
Qty: 120 CAPSULE | Refills: 11 | Status: SHIPPED | OUTPATIENT
Start: 2024-08-04 | End: 2024-08-05 | Stop reason: SDUPTHER

## 2024-08-05 ENCOUNTER — TELEPHONE (OUTPATIENT)
Dept: TRANSPLANT | Facility: HOSPITAL | Age: 59
End: 2024-08-05
Payer: COMMERCIAL

## 2024-08-05 DIAGNOSIS — Z94.2 LUNG TRANSPLANTED (MULTI): ICD-10-CM

## 2024-08-05 RX ORDER — TACROLIMUS 1 MG/1
2 CAPSULE ORAL EVERY 12 HOURS
Qty: 120 CAPSULE | Refills: 11 | Status: SHIPPED | OUTPATIENT
Start: 2024-08-05

## 2024-09-11 ENCOUNTER — LAB (OUTPATIENT)
Dept: LAB | Facility: LAB | Age: 59
End: 2024-09-11
Payer: COMMERCIAL

## 2024-09-11 DIAGNOSIS — Z94.2 LUNG TRANSPLANTED (MULTI): ICD-10-CM

## 2024-09-11 LAB
ALBUMIN SERPL BCP-MCNC: 4.8 G/DL (ref 3.4–5)
ALP SERPL-CCNC: 60 U/L (ref 33–136)
ALT SERPL W P-5'-P-CCNC: 16 U/L (ref 7–45)
ANION GAP SERPL CALC-SCNC: 12 MMOL/L (ref 10–20)
AST SERPL W P-5'-P-CCNC: 17 U/L (ref 9–39)
BASOPHILS # BLD AUTO: 0.01 X10*3/UL (ref 0–0.1)
BASOPHILS NFR BLD AUTO: 0.2 %
BILIRUB SERPL-MCNC: 0.6 MG/DL (ref 0–1.2)
BUN SERPL-MCNC: 25 MG/DL (ref 6–23)
CALCIUM SERPL-MCNC: 9.8 MG/DL (ref 8.6–10.3)
CHLORIDE SERPL-SCNC: 97 MMOL/L (ref 98–107)
CO2 SERPL-SCNC: 28 MMOL/L (ref 21–32)
CREAT SERPL-MCNC: 0.94 MG/DL (ref 0.5–1.05)
EGFRCR SERPLBLD CKD-EPI 2021: 69 ML/MIN/1.73M*2
EOSINOPHIL # BLD AUTO: 0.03 X10*3/UL (ref 0–0.7)
EOSINOPHIL NFR BLD AUTO: 0.5 %
ERYTHROCYTE [DISTWIDTH] IN BLOOD BY AUTOMATED COUNT: 12.9 % (ref 11.5–14.5)
GLUCOSE SERPL-MCNC: 101 MG/DL (ref 74–99)
HCT VFR BLD AUTO: 37.8 % (ref 36–46)
HGB BLD-MCNC: 12.8 G/DL (ref 12–16)
IGG SERPL-MCNC: 933 MG/DL (ref 700–1600)
IMM GRANULOCYTES # BLD AUTO: 0.01 X10*3/UL (ref 0–0.7)
IMM GRANULOCYTES NFR BLD AUTO: 0.2 % (ref 0–0.9)
LYMPHOCYTES # BLD AUTO: 2.24 X10*3/UL (ref 1.2–4.8)
LYMPHOCYTES NFR BLD AUTO: 40.4 %
MAGNESIUM SERPL-MCNC: 1.86 MG/DL (ref 1.6–2.4)
MCH RBC QN AUTO: 38.2 PG (ref 26–34)
MCHC RBC AUTO-ENTMCNC: 33.9 G/DL (ref 32–36)
MCV RBC AUTO: 113 FL (ref 80–100)
MONOCYTES # BLD AUTO: 0.79 X10*3/UL (ref 0.1–1)
MONOCYTES NFR BLD AUTO: 14.2 %
NEUTROPHILS # BLD AUTO: 2.47 X10*3/UL (ref 1.2–7.7)
NEUTROPHILS NFR BLD AUTO: 44.5 %
NRBC BLD-RTO: 0 /100 WBCS (ref 0–0)
PLATELET # BLD AUTO: 197 X10*3/UL (ref 150–450)
POTASSIUM SERPL-SCNC: 4.3 MMOL/L (ref 3.5–5.3)
PROT SERPL-MCNC: 7.1 G/DL (ref 6.4–8.2)
RBC # BLD AUTO: 3.35 X10*6/UL (ref 4–5.2)
SODIUM SERPL-SCNC: 133 MMOL/L (ref 136–145)
TACROLIMUS BLD-MCNC: 5.8 NG/ML
WBC # BLD AUTO: 5.6 X10*3/UL (ref 4.4–11.3)

## 2024-09-11 PROCEDURE — 80197 ASSAY OF TACROLIMUS: CPT

## 2024-09-11 PROCEDURE — 80053 COMPREHEN METABOLIC PANEL: CPT

## 2024-09-11 PROCEDURE — 36415 COLL VENOUS BLD VENIPUNCTURE: CPT

## 2024-09-11 PROCEDURE — 82784 ASSAY IGA/IGD/IGG/IGM EACH: CPT

## 2024-09-11 PROCEDURE — 85060 BLOOD SMEAR INTERPRETATION: CPT | Performed by: INTERNAL MEDICINE

## 2024-09-11 PROCEDURE — 83735 ASSAY OF MAGNESIUM: CPT

## 2024-09-11 PROCEDURE — 85025 COMPLETE CBC W/AUTO DIFF WBC: CPT

## 2024-09-12 ENCOUNTER — DOCUMENTATION (OUTPATIENT)
Dept: TRANSPLANT | Facility: HOSPITAL | Age: 59
End: 2024-09-12
Payer: COMMERCIAL

## 2024-09-12 DIAGNOSIS — Z94.2 LUNG TRANSPLANTED (MULTI): ICD-10-CM

## 2024-09-12 LAB
CMV DNA SERPL NAA+PROBE-LOG IU: NORMAL {LOG_IU}/ML
LABORATORY COMMENT REPORT: NOT DETECTED
PATH REVIEW-CBC DIFFERENTIAL: NORMAL

## 2024-09-12 NOTE — PROGRESS NOTES
Labs reviewed on 9/12/24  WBC 5.6 ANC 2.47  Hbg 12.8  Platelets 197  Creatinine 0.94  Magnesium 1.86  Prograf 5.8 Goal 5-7 Dose 2mg BID     -Changes made: None  -Labs due next 10/21    [employer] message sent to patient in regards to labs and when to repeat.

## 2024-09-13 ENCOUNTER — TELEPHONE (OUTPATIENT)
Dept: TRANSPLANT | Facility: HOSPITAL | Age: 59
End: 2024-09-13
Payer: COMMERCIAL

## 2024-09-13 DIAGNOSIS — Z94.2 LUNG TRANSPLANTED (MULTI): ICD-10-CM

## 2024-09-13 NOTE — TELEPHONE ENCOUNTER
Labs reviewed on 9/12/24  WBC 5.6 ANC 2.47  Hbg 12.8  Platelets 197  Creatinine 0.94  Magnesium 1.86  Prograf 5.8 Goal 5-7 Dose 2mg BID     -Changes made: None  -Labs due next 10/21    Called patient and left voicemail in regards to above results and plan. Instructed patient to call back the office with any questions or concerns.

## 2024-09-18 ENCOUNTER — TELEPHONE (OUTPATIENT)
Dept: TRANSPLANT | Facility: HOSPITAL | Age: 59
End: 2024-09-18
Payer: COMMERCIAL

## 2024-09-18 NOTE — TELEPHONE ENCOUNTER
Patient called and stated that she had a death in the family and that her mother-in-law is sick with shingles, patient is asking when she would be allowed to see her mother, per Dr. Chung patient needs to wait 3 weeks before having contact due to immunosuppression. Patient verbalized understanding and has no other questions at this time.

## 2024-09-24 ENCOUNTER — HOSPITAL ENCOUNTER (OUTPATIENT)
Dept: RESPIRATORY THERAPY | Facility: HOSPITAL | Age: 59
Discharge: HOME | End: 2024-09-24
Payer: COMMERCIAL

## 2024-09-24 ENCOUNTER — OFFICE VISIT (OUTPATIENT)
Dept: TRANSPLANT | Facility: HOSPITAL | Age: 59
End: 2024-09-24
Payer: COMMERCIAL

## 2024-09-24 VITALS
OXYGEN SATURATION: 98 % | SYSTOLIC BLOOD PRESSURE: 116 MMHG | WEIGHT: 171 LBS | BODY MASS INDEX: 26.78 KG/M2 | TEMPERATURE: 97.8 F | DIASTOLIC BLOOD PRESSURE: 78 MMHG | HEART RATE: 73 BPM

## 2024-09-24 DIAGNOSIS — Z94.2 LUNG TRANSPLANTED (MULTI): ICD-10-CM

## 2024-09-24 DIAGNOSIS — Z79.60 LONG-TERM USE OF IMMUNOSUPPRESSANT MEDICATION: Primary | ICD-10-CM

## 2024-09-24 PROCEDURE — 94010 BREATHING CAPACITY TEST: CPT | Performed by: INTERNAL MEDICINE

## 2024-09-24 PROCEDURE — 94010 BREATHING CAPACITY TEST: CPT

## 2024-09-24 PROCEDURE — 99215 OFFICE O/P EST HI 40 MIN: CPT | Performed by: INTERNAL MEDICINE

## 2024-09-24 PROCEDURE — 3074F SYST BP LT 130 MM HG: CPT | Performed by: INTERNAL MEDICINE

## 2024-09-24 PROCEDURE — 3078F DIAST BP <80 MM HG: CPT | Performed by: INTERNAL MEDICINE

## 2024-09-24 RX ORDER — LORATADINE 10 MG/1
10 TABLET ORAL DAILY
COMMUNITY

## 2024-09-24 ASSESSMENT — ENCOUNTER SYMPTOMS
CHANGE IN BOWEL HABIT: 0
ABDOMINAL PAIN: 0
TREMORS: 0
HEARTBURN: 0
MUSCULOSKELETAL NEGATIVE: 1
SORE THROAT: 0
RESPIRATORY NEGATIVE: 1
FALLS: 0
COUGH: 0
PARESTHESIAS: 0
HEMATURIA: 0
CARDIOVASCULAR NEGATIVE: 1
CONSTIPATION: 0
DIZZINESS: 0
PSYCHIATRIC NEGATIVE: 1
HEADACHES: 0
NEUROLOGICAL NEGATIVE: 1
DYSPNEA ON EXERTION: 0
FEVER: 0
DIARRHEA: 0
EYES NEGATIVE: 1
NIGHT SWEATS: 0
LIGHT-HEADEDNESS: 0
NAUSEA: 0
HEMATOLOGIC/LYMPHATIC NEGATIVE: 1
PALPITATIONS: 0
SHORTNESS OF BREATH: 0
ENDOCRINE NEGATIVE: 1
MUSCLE CRAMPS: 1
VOMITING: 0
SPUTUM PRODUCTION: 0
NUMBNESS: 0
CONSTITUTIONAL NEGATIVE: 1
WEAKNESS: 0
ALLERGIC/IMMUNOLOGIC NEGATIVE: 1
GASTROINTESTINAL NEGATIVE: 1
CHILLS: 0

## 2024-09-24 ASSESSMENT — PAIN SCALES - GENERAL: PAINLEVEL: 0-NO PAIN

## 2024-09-24 NOTE — PROGRESS NOTES
Review of Systems   Constitutional: Negative for chills, fever, malaise/fatigue and night sweats.   HENT:  Negative for congestion and sore throat.    Eyes: Negative.    Cardiovascular:  Negative for chest pain, dyspnea on exertion, leg swelling and palpitations.   Respiratory:  Negative for cough, shortness of breath and sputum production.    Skin: Negative.    Musculoskeletal:  Positive for muscle cramps (at night in feet). Negative for falls and muscle weakness.   Gastrointestinal:  Negative for abdominal pain, change in bowel habit, constipation, diarrhea, heartburn, nausea and vomiting.   Genitourinary:  Negative for hematuria.   Neurological:  Negative for dizziness, headaches, light-headedness, numbness, paresthesias, tremors and weakness.     Patient seen in clinic today and states she is doing well overall. Patient to see derm in December and made appointment with ophthalmology and will need cataracts surgery in her left eye. Patient asking if she should get the shingles vaccine, per physician, okay to get vaccine. No other concerns at this time. Patient to return to clinic in 3 months with cr and cxray.

## 2024-09-24 NOTE — PROGRESS NOTES
Chief Complaint:  59 y.o.  y/o M with history of s/p LTx . who was last seen on 6/25/2024 and presents today for: Follow-up transplant.       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 2.64 P   FVC - PRE 3.02 P   FEV1 - Pre 1.99        Current Immunosuppression:   -Prograf, Cellcept, Prednisone    Interval Medical History:  Last seen 6/25/2024. Since his last visit, no complaints.     Patient Active Problem List   Diagnosis    Transplant    History of lung transplant (Multi)    Status post right breast biopsy    Plantar fascial fibromatosis    Pain in both feet    Other seborrheic keratosis    Osteopenia    Long-term use of immunosuppressant medication    Hypomagnesemia    Hypertension    Benign essential hypertension    Chronic rhinitis    Common migraine without aura    Cystic breast    Fibrocystic disease of right breast    Hammer toe of left foot    Melanocytic nevi of trunk    Melanocytic nevi of unspecified part of face            Meds    Current Outpatient Medications:     albuterol 90 mcg/actuation inhaler, Inhale 2 puffs every 6 hours if needed for wheezing., Disp: 18 g, Rfl: 11    alendronate (Fosamax) 70 mg tablet, Take 1 tablet (70 mg) by mouth 1 (one) time per week., Disp: , Rfl:     atenolol (Tenormin) 25 mg tablet, Take 1 tablet (25 mg) by mouth once daily., Disp: , Rfl:     azithromycin (Zithromax) 250 mg tablet, Take 1 tablet (250 mg) by mouth once daily., Disp: , Rfl:     azithromycin (Zithromax) 250 mg tablet, Take 1 tablet (250 mg) by mouth once daily., Disp: 90 tablet, Rfl: 3    dapsone 100 mg tablet, TAKE 1 TABLET ON MONDAY, WEDNESDAY, AND FRIDAY, Disp: 40 tablet, Rfl: 3    ergocalciferol, vitamin D2, (VITAMIN D2 ORAL), Take by mouth., Disp: , Rfl:     fluticasone (Flonase) 50 mcg/actuation nasal spray, USE 1 SPRAY IN EACH  NOSTRIL DAILY, Disp: 144 g, Rfl: 5    folic acid (Folvite) 1 mg tablet, TAKE 1 TABLET DAILY, Disp: 90 tablet, Rfl: 3    loratadine (Claritin) 10 mg tablet, Take 1 tablet (10 mg) by mouth once daily., Disp: , Rfl:     losartan (Cozaar) 100 mg tablet, Take 1 tablet (100 mg) by mouth once daily., Disp: 90 tablet, Rfl: 3    mycophenolate (Cellcept) 250 mg capsule, Take 5 capsules (1,250 mg) by mouth 2 times a day., Disp: 600 capsule, Rfl: 11    predniSONE (Deltasone) 1 mg tablet, TAKE 3 TABLETS DAILY, Disp: 270 tablet, Rfl: 3    tacrolimus (Prograf) 1 mg capsule, Take 2 capsules (2 mg) by mouth every 12 hours., Disp: 120 capsule, Rfl: 11    valACYclovir (Valtrex) 500 mg tablet, TAKE 1 TABLET DAILY, Disp: 90 tablet, Rfl: 3    eletriptan (Relpax) 20 mg tablet, Take 1 tablet (20 mg) by mouth 1 time if needed for migraine (Migranes). (Patient not taking: Reported on 9/24/2024), Disp: 6 tablet, Rfl: 3     Vitals:    09/24/24 1121   BP: 116/78   Pulse: 73   Temp: 36.6 °C (97.8 °F)   SpO2: 98%      Physical Exam  Constitutional:       Appearance: Normal appearance.   HENT:      Head: Normocephalic.   Cardiovascular:      Rate and Rhythm: Normal rate and regular rhythm.      Heart sounds: Normal heart sounds.   Pulmonary:      Breath sounds: Normal breath sounds.   Abdominal:      General: Bowel sounds are normal.      Palpations: Abdomen is soft.   Musculoskeletal:      Right lower leg: No edema.      Left lower leg: No edema.   Skin:     Findings: No rash.   Psychiatric:         Mood and Affect: Mood normal.         Judgment: Judgment normal.          Review of Systems  Review of Systems   Constitutional: Negative.    HENT: Negative.     Eyes: Negative.    Respiratory: Negative.     Cardiovascular: Negative.    Gastrointestinal: Negative.    Endocrine: Negative.    Genitourinary: Negative.    Musculoskeletal: Negative.    Skin: Negative.    Allergic/Immunologic: Negative.    Neurological: Negative.    Hematological: Negative.     Psychiatric/Behavioral: Negative.     All other systems reviewed and are negative.      Lab Results 9/11/2024  Tac = 5.8  CMV (-)   Cholesterol 211, LDL = 120  IgG = 933  Na = 131  Hb =12.8  ALC =2240        ASSESSMENT / PLAN:       1. Pulmonary-s/p DLTx on for Familial IPF [facility] 1999.     Allograft function:  -PFT's, stable without evidence of CLAD    Immunosuppression  -Prograf, goal 5-7  -Cellcept 1000 mg BID  -Prednisone 5mg daily.    Infectious Prophylaxis  -Bactrim for PJP prophylaxis  -CMV -/+, continue Valcyte 450 mg daily, will continue to follow weekly CMV PCR's closely.  -Voriconazole 200 mg BID for anti-fungal prophylaxis, plan to continue for minimum of 4 month course, will continue to follow LFTs closely.  Stable    2) Cardiovascular  a. Hypertension - now under control, interval rebalancing of meds, now resolved with no edema.  b. Raynauds        3) Renal/  a. Clinically with euvolemic hyponatremia in past . Recent transient 126 associated with known salt restriction + high water intake. Now normal.    4) GI    5) Endocrine - perimenopausal.    6) Neuropsych    7) Musculoskeletal    8) Heme-Onc    9) Prophylaxis/Health Maintenance  A. Vaccinations Influenza/Pneumococcal/COVID vaccines.   B. Cancer screening  1. Pap- 3/2023  2. Rafael- 3/2023  3. Colonoscopy- Done on 07/23/18. Recommended repeat in 10 years?  C. Dexa scan- Done on 1/12/22 due 1/22/2024  D. Dermatology: Will be seen in next few weeks.          Plans:  1) Follow up 3 months with CXR, cr.  2) Routine labs in the meantime.

## 2024-09-24 NOTE — PATIENT INSTRUCTIONS
Thank you for coming in to clinic today, it was nice to see you.     Today we discussed the following:  - Repeat labs on 10/21, they do include lipid panel, please be sure to fast prior to getting these drawn  - You are okay to get the Shingrix vaccine.     Medication Changes:  - None    Follow-up  - Return to clinic is 3 months with chest xray and spirometry    As a friendly reminder:  - Call the office if you develop any symptoms including but not limited to fevers, chills, nausea, vomiting, aches, shortness of breath, or coughing  - Do not let your medications run out, try to maintain at least a 1 week supply at all times. Call the office early if refills are needed as these can be difficult to obtain on weekends  - Please keep all your appointments    As always, call the office at [phone] or [phone] for any issues.     Out of hours, please call the Cardiothoracic Transplant On-Call [phone] for emergencies

## 2024-09-26 LAB
MGC ASCENT PFT - FEV1 - PRE: 1.96
MGC ASCENT PFT - FEV1 - PREDICTED: 2.61
MGC ASCENT PFT - FVC - PRE: 2.84
MGC ASCENT PFT - FVC - PREDICTED: 3.32

## 2024-09-30 DIAGNOSIS — Z94.2 LUNG TRANSPLANT RECIPIENT (MULTI): ICD-10-CM

## 2024-09-30 RX ORDER — VALACYCLOVIR HYDROCHLORIDE 500 MG/1
500 TABLET, FILM COATED ORAL DAILY
Qty: 90 TABLET | Refills: 3 | Status: SHIPPED | OUTPATIENT
Start: 2024-09-30

## 2024-10-07 DIAGNOSIS — Z94.2 LUNG TRANSPLANT RECIPIENT (MULTI): ICD-10-CM

## 2024-10-07 RX ORDER — FOLIC ACID 1 MG/1
1 TABLET ORAL DAILY
Qty: 90 TABLET | Refills: 3 | Status: SHIPPED | OUTPATIENT
Start: 2024-10-07

## 2024-10-10 ENCOUNTER — PATIENT MESSAGE (OUTPATIENT)
Dept: DERMATOLOGY | Facility: HOSPITAL | Age: 59
End: 2024-10-10
Payer: COMMERCIAL

## 2024-10-10 DIAGNOSIS — L21.9 SEBORRHEIC DERMATITIS: ICD-10-CM

## 2024-10-10 RX ORDER — CIPROFLOXACIN AND DEXAMETHASONE 3; 1 MG/ML; MG/ML
4 SUSPENSION/ DROPS AURICULAR (OTIC) 2 TIMES DAILY
Qty: 7.5 ML | Refills: 3 | Status: SHIPPED | OUTPATIENT
Start: 2024-10-10

## 2024-11-05 ENCOUNTER — TELEPHONE (OUTPATIENT)
Dept: TRANSPLANT | Facility: HOSPITAL | Age: 59
End: 2024-11-05
Payer: COMMERCIAL

## 2024-11-05 NOTE — TELEPHONE ENCOUNTER
Called to check in on patient, call sent to voicemail, voicemail left to return call and get labs.

## 2024-11-12 ENCOUNTER — LAB (OUTPATIENT)
Dept: LAB | Facility: LAB | Age: 59
End: 2024-11-12
Payer: COMMERCIAL

## 2024-11-12 DIAGNOSIS — Z94.2 LUNG TRANSPLANTED (MULTI): ICD-10-CM

## 2024-11-12 LAB
ALBUMIN SERPL BCP-MCNC: 4.3 G/DL (ref 3.4–5)
ALP SERPL-CCNC: 47 U/L (ref 33–136)
ALT SERPL W P-5'-P-CCNC: 16 U/L (ref 7–45)
ANION GAP SERPL CALC-SCNC: 13 MMOL/L (ref 10–20)
AST SERPL W P-5'-P-CCNC: 20 U/L (ref 9–39)
BASOPHILS # BLD AUTO: 0.01 X10*3/UL (ref 0–0.1)
BASOPHILS NFR BLD AUTO: 0.2 %
BILIRUB SERPL-MCNC: 0.8 MG/DL (ref 0–1.2)
BUN SERPL-MCNC: 22 MG/DL (ref 6–23)
CALCIUM SERPL-MCNC: 8.9 MG/DL (ref 8.6–10.3)
CHLORIDE SERPL-SCNC: 98 MMOL/L (ref 98–107)
CO2 SERPL-SCNC: 24 MMOL/L (ref 21–32)
CREAT SERPL-MCNC: 0.86 MG/DL (ref 0.5–1.05)
EGFRCR SERPLBLD CKD-EPI 2021: 77 ML/MIN/1.73M*2
EOSINOPHIL # BLD AUTO: 0.05 X10*3/UL (ref 0–0.7)
EOSINOPHIL NFR BLD AUTO: 0.9 %
ERYTHROCYTE [DISTWIDTH] IN BLOOD BY AUTOMATED COUNT: 12.4 % (ref 11.5–14.5)
GLUCOSE SERPL-MCNC: 105 MG/DL (ref 74–99)
HCT VFR BLD AUTO: 36 % (ref 36–46)
HGB BLD-MCNC: 12.1 G/DL (ref 12–16)
IMM GRANULOCYTES # BLD AUTO: 0.02 X10*3/UL (ref 0–0.7)
IMM GRANULOCYTES NFR BLD AUTO: 0.4 % (ref 0–0.9)
LYMPHOCYTES # BLD AUTO: 2.09 X10*3/UL (ref 1.2–4.8)
LYMPHOCYTES NFR BLD AUTO: 36.9 %
MAGNESIUM SERPL-MCNC: 1.71 MG/DL (ref 1.6–2.4)
MCH RBC QN AUTO: 37.2 PG (ref 26–34)
MCHC RBC AUTO-ENTMCNC: 33.6 G/DL (ref 32–36)
MCV RBC AUTO: 111 FL (ref 80–100)
MONOCYTES # BLD AUTO: 0.72 X10*3/UL (ref 0.1–1)
MONOCYTES NFR BLD AUTO: 12.7 %
NEUTROPHILS # BLD AUTO: 2.78 X10*3/UL (ref 1.2–7.7)
NEUTROPHILS NFR BLD AUTO: 48.9 %
NRBC BLD-RTO: 0 /100 WBCS (ref 0–0)
PLATELET # BLD AUTO: 187 X10*3/UL (ref 150–450)
POTASSIUM SERPL-SCNC: 4.1 MMOL/L (ref 3.5–5.3)
PROT SERPL-MCNC: 6.4 G/DL (ref 6.4–8.2)
RBC # BLD AUTO: 3.25 X10*6/UL (ref 4–5.2)
SODIUM SERPL-SCNC: 131 MMOL/L (ref 136–145)
WBC # BLD AUTO: 5.7 X10*3/UL (ref 4.4–11.3)

## 2024-11-12 PROCEDURE — 83735 ASSAY OF MAGNESIUM: CPT

## 2024-11-12 PROCEDURE — 87799 DETECT AGENT NOS DNA QUANT: CPT

## 2024-11-12 PROCEDURE — 36415 COLL VENOUS BLD VENIPUNCTURE: CPT

## 2024-11-12 PROCEDURE — 82306 VITAMIN D 25 HYDROXY: CPT

## 2024-11-12 PROCEDURE — 80053 COMPREHEN METABOLIC PANEL: CPT

## 2024-11-12 PROCEDURE — 82784 ASSAY IGA/IGD/IGG/IGM EACH: CPT

## 2024-11-12 PROCEDURE — 80197 ASSAY OF TACROLIMUS: CPT

## 2024-11-12 PROCEDURE — 85025 COMPLETE CBC W/AUTO DIFF WBC: CPT

## 2024-11-13 ENCOUNTER — TELEPHONE (OUTPATIENT)
Dept: TRANSPLANT | Facility: HOSPITAL | Age: 59
End: 2024-11-13
Payer: COMMERCIAL

## 2024-11-13 DIAGNOSIS — Z94.2 LUNG TRANSPLANTED (MULTI): ICD-10-CM

## 2024-11-13 LAB
25(OH)D3 SERPL-MCNC: 45 NG/ML (ref 30–100)
CMV DNA SERPL NAA+PROBE-LOG IU: ABNORMAL {LOG_IU}/ML
EBV DNA SPEC NAA+PROBE-LOG#: NORMAL {LOG_COPIES}/ML
IGG SERPL-MCNC: 851 MG/DL (ref 700–1600)
LABORATORY COMMENT REPORT: ABNORMAL
LABORATORY COMMENT REPORT: NOT DETECTED
TACROLIMUS BLD-MCNC: 5.6 NG/ML

## 2024-11-13 NOTE — TELEPHONE ENCOUNTER
----- Message from Daniel Tan sent at 11/13/2024 11:16 AM EST -----  agree  ----- Message -----  From: Stephanie Mi RN  Sent: 11/13/2024  10:47 AM EST  To: Daniel Tan DO; #    Labs reviewed on 11/13/24  WBC 5.7 ANC 2.78  Hbg 12.1  Platelets 187  Creatinine 0.86  Magnesium 1.71  Prograf 5.6 Goal 5-7 Dose 2mg BID    -Changes made: None  -Labs due next 12/16

## 2024-11-15 DIAGNOSIS — Z94.2 LUNG TRANSPLANTED (MULTI): ICD-10-CM

## 2024-12-04 DIAGNOSIS — Z94.2 LUNG TRANSPLANTED (MULTI): Primary | ICD-10-CM

## 2024-12-04 DIAGNOSIS — Z94.2 LUNG TRANSPLANTED (MULTI): ICD-10-CM

## 2024-12-04 PROCEDURE — 99212 OFFICE O/P EST SF 10 MIN: CPT | Performed by: NURSE PRACTITIONER

## 2024-12-16 ENCOUNTER — OFFICE VISIT (OUTPATIENT)
Dept: DERMATOLOGY | Facility: HOSPITAL | Age: 59
End: 2024-12-16
Payer: COMMERCIAL

## 2024-12-16 DIAGNOSIS — D84.9 IMMUNOCOMPROMISED STATE: ICD-10-CM

## 2024-12-16 DIAGNOSIS — L82.1 SEBORRHEIC KERATOSIS: ICD-10-CM

## 2024-12-16 DIAGNOSIS — Z12.83 SCREENING EXAM FOR SKIN CANCER: ICD-10-CM

## 2024-12-16 DIAGNOSIS — D22.9 MULTIPLE BENIGN NEVI: Primary | ICD-10-CM

## 2024-12-16 DIAGNOSIS — A49.8 PSEUDOMONAS INFECTION: ICD-10-CM

## 2024-12-16 PROCEDURE — 99214 OFFICE O/P EST MOD 30 MIN: CPT | Performed by: DERMATOLOGY

## 2024-12-16 RX ORDER — GENTAMICIN SULFATE 1 MG/G
OINTMENT TOPICAL
Qty: 30 G | Refills: 0 | Status: SHIPPED | OUTPATIENT
Start: 2024-12-16

## 2024-12-16 ASSESSMENT — DERMATOLOGY QUALITY OF LIFE (QOL) ASSESSMENT
RATE HOW EMOTIONALLY BOTHERED YOU ARE BY YOUR SKIN PROBLEM (FOR EXAMPLE, WORRY, EMBARRASSMENT, FRUSTRATION): 0 - NEVER BOTHERED
RATE HOW BOTHERED YOU ARE BY EFFECTS OF YOUR SKIN PROBLEMS ON YOUR ACTIVITIES (EG, GOING OUT, ACCOMPLISHING WHAT YOU WANT, WORK ACTIVITIES OR YOUR RELATIONSHIPS WITH OTHERS): 0 - NEVER BOTHERED
RATE HOW BOTHERED YOU ARE BY SYMPTOMS OF YOUR SKIN PROBLEM (EG, ITCHING, STINGING BURNING, HURTING OR SKIN IRRITATION): 0 - NEVER BOTHERED
WAS THE PATIENT DIAGNOSED WITH A SKIN CONDITION THAT IS INCLUDED IN THE DENOMINATOR DEFINITION (E.G.PSORIASIS, DERMATITIS) BUT IDENTIFIED A SKIN CONDITION THAT IS NOT (E.G. MELANOMA, NEVI) THE MAIN CONDITION ON THEIR ASSESSMENT: NO
WHAT SINGLE SKIN CONDITION LISTED BELOW IS THE PATIENT ANSWERING THE QUALITY-OF-LIFE ASSESSMENT QUESTIONS ABOUT: NONE OF THE ABOVE
RATE HOW EMOTIONALLY BOTHERED YOU ARE BY YOUR SKIN PROBLEM (FOR EXAMPLE, WORRY, EMBARRASSMENT, FRUSTRATION): 0 - NEVER BOTHERED
RATE HOW BOTHERED YOU ARE BY EFFECTS OF YOUR SKIN PROBLEMS ON YOUR ACTIVITIES (EG, GOING OUT, ACCOMPLISHING WHAT YOU WANT, WORK ACTIVITIES OR YOUR RELATIONSHIPS WITH OTHERS): 0 - NEVER BOTHERED
DATE THE QUALITY-OF-LIFE ASSESSMENT WAS COMPLETED: 67190
RATE HOW BOTHERED YOU ARE BY SYMPTOMS OF YOUR SKIN PROBLEM (EG, ITCHING, STINGING BURNING, HURTING OR SKIN IRRITATION): 0 - NEVER BOTHERED
WHAT SINGLE SKIN CONDITION LISTED BELOW IS THE PATIENT ANSWERING THE QUALITY-OF-LIFE ASSESSMENT QUESTIONS ABOUT: NONE OF THE ABOVE
RATE HOW EMOTIONALLY BOTHERED YOU ARE BY YOUR SKIN PROBLEM (FOR EXAMPLE, WORRY, EMBARRASSMENT, FRUSTRATION): 0 - NEVER BOTHERED
RATE HOW BOTHERED YOU ARE BY EFFECTS OF YOUR SKIN PROBLEMS ON YOUR ACTIVITIES (EG, GOING OUT, ACCOMPLISHING WHAT YOU WANT, WORK ACTIVITIES OR YOUR RELATIONSHIPS WITH OTHERS): 0 - NEVER BOTHERED
DID THE PATIENT HAVE A SEVERE MENTAL AND/OR PHYSICAL INCAPACITY THAT PREVENTED HIM OR HER FROM COMPLETING THE ASSESSMENT: NO
DID YOU DOCUMENT A PATIENT REASON(S) FOR NOT USING THE QUALITY OF LIFE ASSESSMENT: NO
ARE THERE EXCLUSIONS OR EXCEPTIONS FOR THE QUALITY OF LIFE ASSESSMENT: NO
RATE HOW BOTHERED YOU ARE BY SYMPTOMS OF YOUR SKIN PROBLEM (EG, ITCHING, STINGING BURNING, HURTING OR SKIN IRRITATION): 0 - NEVER BOTHERED

## 2024-12-16 ASSESSMENT — DERMATOLOGY PATIENT ASSESSMENT
DO YOU HAVE ANY NEW OR CHANGING LESIONS: YES
HAVE YOU HAD OR DO YOU HAVE A STAPH INFECTION: NO
DO YOU USE SUNSCREEN: OCCASIONALLY
DO YOU USE A TANNING BED: NO
ARE YOU ON BIRTH CONTROL: NO
ARE YOU AN ORGAN TRANSPLANT RECIPIENT: YES
DO YOU HAVE IRREGULAR MENSTRUAL CYCLES: NO
HAVE YOU HAD OR DO YOU HAVE VASCULAR DISEASE: NO
WHERE ARE THESE NEW OR CHANGING LESIONS LOCATED: LT FOOT
ARE YOU TRYING TO GET PREGNANT: NO

## 2024-12-16 ASSESSMENT — PATIENT GLOBAL ASSESSMENT (PGA)
PATIENT GLOBAL ASSESSMENT: PATIENT GLOBAL ASSESSMENT:  1 - CLEAR
WHAT IS THE PGA: PATIENT GLOBAL ASSESSMENT:  1 - CLEAR

## 2024-12-16 ASSESSMENT — ITCH NUMERIC RATING SCALE: HOW SEVERE IS YOUR ITCHING?: 0

## 2024-12-16 NOTE — PROGRESS NOTES
Subjective     Carmen Ash is a 59 y.o. female who presents for the following: Skin Check (Lt foot). Last derm visit 12/18/23 for Full Skin Exam.     Lung transplant 1999 still on immunosuppression, doing well           Review of Systems:  No other skin or systemic complaints other than what is documented elsewhere in the note.    The following portions of the chart were reviewed this encounter and updated as appropriate:  Tobacco  Allergies  Meds  Problems  Med Hx  Surg Hx         Skin Cancer History  No skin cancer on file.      Specialty Problems          Dermatology Problems    Melanocytic nevi of trunk    Melanocytic nevi of unspecified part of face    Other seborrheic keratosis        Objective   Well appearing patient in no apparent distress; mood and affect are within normal limits.    A full examination was performed including scalp, head, eyes, ears, nose, lips, neck, chest, axillae, abdomen, back, buttocks, bilateral upper extremities, bilateral lower extremities, hands, feet, fingers, toes, fingernails, and toenails. All findings within normal limits unless otherwise noted below.    Assessment/Plan   1. Multiple benign nevi  Brown and tan macules and papules with reassuring findings on dermoscopy    -These lesions have benign, reassuring patterns on dermoscopy  -Recommend continued self observation, and to contact the office if any changes in nevi are noticed    2. Screening exam for skin cancer    Full body skin exam  -No lesions concerning for malignancy on the remainder the skin exam today   - The ugly duckling sign was discussed. Monitor for any skin lesions that are different in color, shape, or size than others on body  -Sun protection was discussed. Recommend SPF 30+, hats with brims, sun protective clothing, and avoiding sun exposure between 10 AM and 2 PM whenever possible  -Recommend regular skin exams or sooner if new or changing lesions       Related Procedures  Follow Up In  Dermatology - Established Patient  Follow Up In Dermatology - Established Patient    3. Seborrheic keratosis  Stuck on, waxy macule(s)/papule(s)/plaque(s) with comedo-like openings and milia like cysts    -Discussed the nature of the diagnosis  -Reassurance, recommend continued observation    4. Immunocompromised state  Lung transplant 1999 still on immunosuppression, doing well     5. Pseudomonas infection  Left Thumbnail  Blue green focal discoloration of nail plate. [place] of dystrophy of many nails    History of onychomycosis, now with pseudomonas super infection of one nail    Treat with dilute vinegar soaks and/or gentamicin topically    gentamicin (Garamycin) 0.1 % ointment - Left Thumbnail  Apply to the affected area twice daily for 2-3 months        Follow up 1 year Full Skin Exam

## 2024-12-16 NOTE — PATIENT INSTRUCTIONS
Dilute vinegar soaks    1:8 dilution of white vinegar in water (1 part vinegar to 8 parts water)  Soak for 5-10 minutes  Do this once or twice a day  Continue for 1 year for toe nails and 6 months for finger nails

## 2024-12-20 ENCOUNTER — TELEPHONE (OUTPATIENT)
Dept: TRANSPLANT | Facility: HOSPITAL | Age: 59
End: 2024-12-20
Payer: COMMERCIAL

## 2024-12-20 NOTE — TELEPHONE ENCOUNTER
Called patient in regards to getting labs done and stated she went to go Wednesday and the [place] branch was closed. Patient stated she will go on Monday for labs.

## 2024-12-23 ENCOUNTER — LAB (OUTPATIENT)
Dept: LAB | Facility: LAB | Age: 59
End: 2024-12-23
Payer: COMMERCIAL

## 2024-12-23 DIAGNOSIS — Z94.2 LUNG TRANSPLANTED (MULTI): ICD-10-CM

## 2024-12-23 LAB
ALBUMIN SERPL BCP-MCNC: 4.7 G/DL (ref 3.4–5)
ALP SERPL-CCNC: 50 U/L (ref 33–136)
ALT SERPL W P-5'-P-CCNC: 22 U/L (ref 7–45)
ANION GAP SERPL CALC-SCNC: 11 MMOL/L (ref 10–20)
AST SERPL W P-5'-P-CCNC: 21 U/L (ref 9–39)
BASOPHILS # BLD AUTO: 0.02 X10*3/UL (ref 0–0.1)
BASOPHILS NFR BLD AUTO: 0.4 %
BILIRUB SERPL-MCNC: 0.5 MG/DL (ref 0–1.2)
BUN SERPL-MCNC: 23 MG/DL (ref 6–23)
CALCIUM SERPL-MCNC: 9.4 MG/DL (ref 8.6–10.3)
CHLORIDE SERPL-SCNC: 98 MMOL/L (ref 98–107)
CHOLEST SERPL-MCNC: 216 MG/DL (ref 0–199)
CHOLESTEROL/HDL RATIO: 2.9
CO2 SERPL-SCNC: 28 MMOL/L (ref 21–32)
CREAT SERPL-MCNC: 0.81 MG/DL (ref 0.5–1.05)
EGFRCR SERPLBLD CKD-EPI 2021: 83 ML/MIN/1.73M*2
EOSINOPHIL # BLD AUTO: 0.02 X10*3/UL (ref 0–0.7)
EOSINOPHIL NFR BLD AUTO: 0.4 %
ERYTHROCYTE [DISTWIDTH] IN BLOOD BY AUTOMATED COUNT: 12.8 % (ref 11.5–14.5)
GLUCOSE SERPL-MCNC: 106 MG/DL (ref 74–99)
HCT VFR BLD AUTO: 36 % (ref 36–46)
HDLC SERPL-MCNC: 74.5 MG/DL
HGB BLD-MCNC: 12.1 G/DL (ref 12–16)
IMM GRANULOCYTES # BLD AUTO: 0.01 X10*3/UL (ref 0–0.7)
IMM GRANULOCYTES NFR BLD AUTO: 0.2 % (ref 0–0.9)
LDLC SERPL CALC-MCNC: 115 MG/DL
LYMPHOCYTES # BLD AUTO: 1.47 X10*3/UL (ref 1.2–4.8)
LYMPHOCYTES NFR BLD AUTO: 31.1 %
MAGNESIUM SERPL-MCNC: 1.69 MG/DL (ref 1.6–2.4)
MCH RBC QN AUTO: 37.5 PG (ref 26–34)
MCHC RBC AUTO-ENTMCNC: 33.6 G/DL (ref 32–36)
MCV RBC AUTO: 112 FL (ref 80–100)
MONOCYTES # BLD AUTO: 0.64 X10*3/UL (ref 0.1–1)
MONOCYTES NFR BLD AUTO: 13.6 %
NEUTROPHILS # BLD AUTO: 2.56 X10*3/UL (ref 1.2–7.7)
NEUTROPHILS NFR BLD AUTO: 54.3 %
NON HDL CHOLESTEROL: 142 MG/DL (ref 0–149)
NRBC BLD-RTO: 0 /100 WBCS (ref 0–0)
PLATELET # BLD AUTO: 192 X10*3/UL (ref 150–450)
POTASSIUM SERPL-SCNC: 4.2 MMOL/L (ref 3.5–5.3)
PROT SERPL-MCNC: 6.7 G/DL (ref 6.4–8.2)
RBC # BLD AUTO: 3.23 X10*6/UL (ref 4–5.2)
SODIUM SERPL-SCNC: 133 MMOL/L (ref 136–145)
TRIGL SERPL-MCNC: 135 MG/DL (ref 0–149)
VLDL: 27 MG/DL (ref 0–40)
WBC # BLD AUTO: 4.7 X10*3/UL (ref 4.4–11.3)

## 2024-12-23 PROCEDURE — 82784 ASSAY IGA/IGD/IGG/IGM EACH: CPT

## 2024-12-23 PROCEDURE — 80061 LIPID PANEL: CPT

## 2024-12-23 PROCEDURE — 80053 COMPREHEN METABOLIC PANEL: CPT

## 2024-12-23 PROCEDURE — 85025 COMPLETE CBC W/AUTO DIFF WBC: CPT

## 2024-12-23 PROCEDURE — 83735 ASSAY OF MAGNESIUM: CPT

## 2024-12-23 PROCEDURE — 81382 HLA II TYPING 1 LOC HR: CPT

## 2024-12-23 PROCEDURE — 81379 HLA I TYPING COMPLETE HR: CPT

## 2024-12-23 PROCEDURE — 80197 ASSAY OF TACROLIMUS: CPT

## 2024-12-24 LAB
IGG SERPL-MCNC: 881 MG/DL (ref 700–1600)
TACROLIMUS BLD-MCNC: 4.3 NG/ML

## 2024-12-25 LAB
CMV DNA SERPL NAA+PROBE-LOG IU: ABNORMAL {LOG_IU}/ML
LABORATORY COMMENT REPORT: ABNORMAL

## 2024-12-26 ENCOUNTER — TELEPHONE (OUTPATIENT)
Dept: TRANSPLANT | Facility: HOSPITAL | Age: 59
End: 2024-12-26

## 2024-12-26 DIAGNOSIS — Z94.2 LUNG TRANSPLANTED (MULTI): ICD-10-CM

## 2024-12-26 DIAGNOSIS — E78.5 HYPERLIPIDEMIA, UNSPECIFIED HYPERLIPIDEMIA TYPE: Primary | ICD-10-CM

## 2024-12-26 PROCEDURE — 99212 OFFICE O/P EST SF 10 MIN: CPT

## 2024-12-26 RX ORDER — ROSUVASTATIN CALCIUM 5 MG/1
5 TABLET, COATED ORAL NIGHTLY
Qty: 90 TABLET | Refills: 3 | Status: SHIPPED | OUTPATIENT
Start: 2024-12-26 | End: 2024-12-26

## 2024-12-26 NOTE — TELEPHONE ENCOUNTER
Labs reviewed on 12/24/24  WBC 4.7 ANC 2.56  Hbg 12.1  Platelets 192  Creatinine 0.81  Magnesium 1.69  IgG 881  Prograf 4.3 Goal 5-7 Dose 2mg BID     -Changes made: None, patient went later in the day and has been stable on current tacro dose was not able to fast prior to lipid panel  -Labs due next 1/27/25    Called and spoke with patient in regards to labs and when to repeat, patient verbalized understanding.

## 2025-01-06 LAB
HLA CLS I TYP PNL BLD/T DONR HIGH RES: NORMAL
HLA RESULTS: NORMAL
HLA RESULTS: NORMAL
HLA-A+B+C AB NFR SER: NORMAL %
HLA-DP+DQ+DR AB NFR SER: NORMAL %
HLA-DP2 QL: NORMAL
HLA-DQB1 HIGH RES: NORMAL
HLA-DRB1 HIGH RES: NORMAL

## 2025-01-07 ENCOUNTER — HOSPITAL ENCOUNTER (OUTPATIENT)
Dept: RADIOLOGY | Facility: HOSPITAL | Age: 60
Discharge: HOME | End: 2025-01-07
Payer: COMMERCIAL

## 2025-01-07 ENCOUNTER — HOSPITAL ENCOUNTER (OUTPATIENT)
Dept: RESPIRATORY THERAPY | Facility: HOSPITAL | Age: 60
Discharge: HOME | End: 2025-01-07
Payer: COMMERCIAL

## 2025-01-07 ENCOUNTER — OFFICE VISIT (OUTPATIENT)
Dept: TRANSPLANT | Facility: HOSPITAL | Age: 60
End: 2025-01-07
Payer: COMMERCIAL

## 2025-01-07 VITALS
DIASTOLIC BLOOD PRESSURE: 87 MMHG | HEART RATE: 70 BPM | SYSTOLIC BLOOD PRESSURE: 131 MMHG | OXYGEN SATURATION: 93 % | WEIGHT: 171 LBS | BODY MASS INDEX: 26.84 KG/M2 | HEIGHT: 67 IN | TEMPERATURE: 97.7 F

## 2025-01-07 DIAGNOSIS — Z94.2 LUNG TRANSPLANTED (MULTI): ICD-10-CM

## 2025-01-07 DIAGNOSIS — Z94.2 HISTORY OF LUNG TRANSPLANT (MULTI): ICD-10-CM

## 2025-01-07 PROCEDURE — 71046 X-RAY EXAM CHEST 2 VIEWS: CPT | Performed by: RADIOLOGY

## 2025-01-07 PROCEDURE — 3008F BODY MASS INDEX DOCD: CPT | Performed by: INTERNAL MEDICINE

## 2025-01-07 PROCEDURE — 99215 OFFICE O/P EST HI 40 MIN: CPT | Performed by: INTERNAL MEDICINE

## 2025-01-07 PROCEDURE — 71046 X-RAY EXAM CHEST 2 VIEWS: CPT

## 2025-01-07 PROCEDURE — 3079F DIAST BP 80-89 MM HG: CPT | Performed by: INTERNAL MEDICINE

## 2025-01-07 PROCEDURE — 99215 OFFICE O/P EST HI 40 MIN: CPT | Mod: 25 | Performed by: INTERNAL MEDICINE

## 2025-01-07 PROCEDURE — 94010 BREATHING CAPACITY TEST: CPT

## 2025-01-07 PROCEDURE — 3075F SYST BP GE 130 - 139MM HG: CPT | Performed by: INTERNAL MEDICINE

## 2025-01-07 ASSESSMENT — ENCOUNTER SYMPTOMS
NEUROLOGICAL NEGATIVE: 1
MUSCULOSKELETAL NEGATIVE: 1
HEMATOLOGIC/LYMPHATIC NEGATIVE: 1
GASTROINTESTINAL NEGATIVE: 1
ENDOCRINE NEGATIVE: 1
RESPIRATORY NEGATIVE: 1
CONSTITUTIONAL NEGATIVE: 1
EYES NEGATIVE: 1
PSYCHIATRIC NEGATIVE: 1
CARDIOVASCULAR NEGATIVE: 1
ALLERGIC/IMMUNOLOGIC NEGATIVE: 1

## 2025-01-07 ASSESSMENT — PAIN SCALES - GENERAL: PAINLEVEL_OUTOF10: 0-NO PAIN

## 2025-01-07 NOTE — PROGRESS NOTES
Chief Complaint:  59 y.o.  y/o M with history of s/p LTx . who was last seen on 6/25/2024 and presents today for: Follow-up transplant.       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 2.64 P   FVC - PRE 3.02 P   FEV1 - Pre 1.99        Current Immunosuppression:   -Prograf, Cellcept, Prednisone    Interval Medical History:  Last seen 6/25/2024. Since his last visit, no complaints.     Patient Active Problem List   Diagnosis    Transplant    History of lung transplant (Multi)    Status post right breast biopsy    Plantar fascial fibromatosis    Pain in both feet    Other seborrheic keratosis    Osteopenia    Long-term use of immunosuppressant medication    Hypomagnesemia    Hypertension    Benign essential hypertension    Chronic rhinitis    Common migraine without aura    Cystic breast    Fibrocystic disease of right breast    Hammer toe of left foot    Melanocytic nevi of trunk    Melanocytic nevi of unspecified part of face            Meds    Current Outpatient Medications:     albuterol 90 mcg/actuation inhaler, Inhale 2 puffs every 6 hours if needed for wheezing., Disp: 18 g, Rfl: 11    alendronate (Fosamax) 70 mg tablet, Take 1 tablet (70 mg) by mouth 1 (one) time per week., Disp: , Rfl:     atenolol (Tenormin) 25 mg tablet, Take 1 tablet (25 mg) by mouth once daily., Disp: , Rfl:     azithromycin (Zithromax) 250 mg tablet, Take 1 tablet (250 mg) by mouth once daily., Disp: , Rfl:     azithromycin (Zithromax) 250 mg tablet, Take 1 tablet (250 mg) by mouth once daily., Disp: 90 tablet, Rfl: 3    ciprofloxacin-dexamethasone (Ciprodex) otic suspension, Administer 4 drops into each ear 2 times a day. For up to 2 weeks as needed for scaling/itching, Disp: 7.5 mL, Rfl:  3    dapsone 100 mg tablet, TAKE 1 TABLET ON MONDAY, WEDNESDAY, AND FRIDAY, Disp: 40 tablet, Rfl: 3    eletriptan (Relpax) 20 mg tablet, Take 1 tablet (20 mg) by mouth 1 time if needed for migraine (Migranes). (Patient not taking: Reported on 9/24/2024), Disp: 6 tablet, Rfl: 3    ergocalciferol, vitamin D2, (VITAMIN D2 ORAL), Take by mouth., Disp: , Rfl:     fluticasone (Flonase) 50 mcg/actuation nasal spray, USE 1 SPRAY IN EACH NOSTRIL DAILY, Disp: 144 g, Rfl: 5    folic acid (Folvite) 1 mg tablet, TAKE 1 TABLET DAILY, Disp: 90 tablet, Rfl: 3    gentamicin (Garamycin) 0.1 % ointment, Apply to the affected area twice daily for 2-3 months, Disp: 30 g, Rfl: 0    loratadine (Claritin) 10 mg tablet, Take 1 tablet (10 mg) by mouth once daily., Disp: , Rfl:     losartan (Cozaar) 100 mg tablet, Take 1 tablet (100 mg) by mouth once daily., Disp: 90 tablet, Rfl: 3    mycophenolate (Cellcept) 250 mg capsule, Take 5 capsules (1,250 mg) by mouth 2 times a day., Disp: 600 capsule, Rfl: 11    predniSONE (Deltasone) 1 mg tablet, TAKE 3 TABLETS DAILY, Disp: 270 tablet, Rfl: 3    tacrolimus (Prograf) 1 mg capsule, Take 2 capsules (2 mg) by mouth every 12 hours., Disp: 120 capsule, Rfl: 11    valACYclovir (Valtrex) 500 mg tablet, TAKE 1 TABLET DAILY, Disp: 90 tablet, Rfl: 3     There were no vitals filed for this visit.     Physical Exam  Constitutional:       Appearance: Normal appearance.   HENT:      Head: Normocephalic.   Cardiovascular:      Rate and Rhythm: Normal rate and regular rhythm.      Heart sounds: Normal heart sounds.   Pulmonary:      Breath sounds: Normal breath sounds.   Abdominal:      General: Bowel sounds are normal.      Palpations: Abdomen is soft.   Musculoskeletal:      Right lower leg: No edema.      Left lower leg: No edema.   Skin:     Findings: No rash.   Psychiatric:         Mood and Affect: Mood normal.         Judgment: Judgment normal.        Review of Systems  Review of Systems   Constitutional:  Negative.    HENT: Negative.     Eyes: Negative.    Respiratory: Negative.     Cardiovascular: Negative.    Gastrointestinal: Negative.    Endocrine: Negative.    Genitourinary: Negative.    Musculoskeletal: Negative.    Skin: Negative.    Allergic/Immunologic: Negative.    Neurological: Negative.    Hematological: Negative.    Psychiatric/Behavioral: Negative.     All other systems reviewed and are negative.      Lab Results 12/26/2024  Tac = 4.3 but draw late  CMV (+) but <35  Cholesterol 216 non-fasting  IgG = 881  Na = 133  Hb =12.1  ALC =2560        ASSESSMENT / PLAN:       1. Pulmonary-s/p DLTx on for Familial IPF [facility] 1999.     Allograft function:  -PFT's, stable without evidence of CLAD  -October HLA resulted with weak ab not previously seen. Since she is so far out, unable to determine if DSA or not. Stable PFTS. Currenetly Will trend.     Immunosuppression  -Prograf, goal 5-7  -Cellcept 1000 mg BID  -Prednisone 5mg daily.    Infectious Prophylaxis  -Bactrim for PJP prophylaxis  -CMV -/+, continue Valcyte 450 mg daily, will continue to follow weekly CMV PCR's closely.  -Voriconazole 200 mg BID for anti-fungal prophylaxis, plan to continue for minimum of 4 month course, will continue to follow LFTs closely.  Stable    2) Cardiovascular  a. Hypertension - now under control, interval rebalancing of meds, now resolved with no edema.  b. Raynauds        3) Renal/  a. Clinically with euvolemic hyponatremia in past . Recent transient 126 associated with known salt restriction + high water intake. Now normal.    4) GI    5) Endocrine - perimenopausal.    6) Neuropsych    7) Musculoskeletal    8) Heme-Onc    9) Prophylaxis/Health Maintenance  A. Vaccinations Influenza/Pneumococcal/COVID vaccines.   B. Cancer screening  1. Pap- 3/2023  2. Rafael- 3/2023  3. Colonoscopy- Done on 07/23/18. Recommended repeat in 10 years?  C. Dexa scan- Done on 1/12/22 due 1/22/2024  D. Dermatology: 12/2024          Plans:  1) Follow  up 3 months with CXR, cr. Will change if increasing ab.   2) Repeat HLA next draw with quantitation.   3) Health maintenance needed  A. Mammo  B. Pap  C. Dexa  D. Derm due mid 2025.

## 2025-01-07 NOTE — PROGRESS NOTES
Patient seen in clinic today and states she is doing well overall. Left eye cataract surgery postponed. Second dose of Shingrix due in Feb. No other concerns at this time. Patient to return to clinic in 3 months with cr and cxray.

## 2025-01-07 NOTE — PATIENT INSTRUCTIONS
Thank you for coming in to clinic today, it was nice to see you.     Today we discussed the following:  - Please repeat labs on 1/27  - You are due for a bone density scan (DEXA) will schedule for you  - Please visit gynecologist for PAP  - Will order a mammogram and get scheduled for you  - Please get chest xray when you leave  - Will will monitor your HLA's, this shows for donor cells in the body, will add on another one for next lab draw.    Medication Changes:  - None    Follow-up  - Please return to clinic in 3 months, April with spirometry and cxray    As a friendly reminder:  - Call the office if you develop any symptoms including but not limited to fevers, chills, nausea, vomiting, aches, shortness of breath, or coughing  - Do not let your medications run out, try to maintain at least a 1 week supply at all times. Call the office early if refills are needed as these can be difficult to obtain on weekends  - Please keep all your appointments    As always, call the office at [phone] or [phone] for any issues during normal business hours    Out of hours, please call the Lung Transplant On-Call [phone] for emergencies

## 2025-01-08 LAB
MGC ASCENT PFT - FEV1 - PRE: 1.93
MGC ASCENT PFT - FEV1 - PREDICTED: 2.6
MGC ASCENT PFT - FVC - PRE: 2.86
MGC ASCENT PFT - FVC - PREDICTED: 3.31

## 2025-01-13 DIAGNOSIS — Z94.2 LUNG TRANSPLANTED (MULTI): ICD-10-CM

## 2025-01-13 RX ORDER — LOSARTAN POTASSIUM 100 MG/1
100 TABLET ORAL DAILY
Qty: 90 TABLET | Refills: 3 | Status: SHIPPED | OUTPATIENT
Start: 2025-01-13

## 2025-01-15 ENCOUNTER — HOSPITAL ENCOUNTER (OUTPATIENT)
Dept: RADIOLOGY | Facility: HOSPITAL | Age: 60
Discharge: HOME | End: 2025-01-15
Payer: COMMERCIAL

## 2025-01-15 DIAGNOSIS — Z94.2 LUNG TRANSPLANTED (MULTI): ICD-10-CM

## 2025-01-15 DIAGNOSIS — N60.19 DIFFUSE CYSTIC MASTOPATHY OF UNSPECIFIED BREAST: ICD-10-CM

## 2025-01-15 PROCEDURE — 77062 BREAST TOMOSYNTHESIS BI: CPT

## 2025-01-15 PROCEDURE — 77062 BREAST TOMOSYNTHESIS BI: CPT | Performed by: STUDENT IN AN ORGANIZED HEALTH CARE EDUCATION/TRAINING PROGRAM

## 2025-01-15 PROCEDURE — 77066 DX MAMMO INCL CAD BI: CPT | Performed by: STUDENT IN AN ORGANIZED HEALTH CARE EDUCATION/TRAINING PROGRAM

## 2025-01-23 ENCOUNTER — LAB (OUTPATIENT)
Dept: LAB | Facility: LAB | Age: 60
End: 2025-01-23
Payer: COMMERCIAL

## 2025-01-23 DIAGNOSIS — Z94.2 LUNG TRANSPLANTED (MULTI): ICD-10-CM

## 2025-01-23 LAB
ALBUMIN SERPL BCP-MCNC: 4.8 G/DL (ref 3.4–5)
ALP SERPL-CCNC: 59 U/L (ref 33–136)
ALT SERPL W P-5'-P-CCNC: 28 U/L (ref 7–45)
ANION GAP SERPL CALC-SCNC: 12 MMOL/L (ref 10–20)
AST SERPL W P-5'-P-CCNC: 21 U/L (ref 9–39)
BASOPHILS # BLD AUTO: 0.02 X10*3/UL (ref 0–0.1)
BASOPHILS NFR BLD AUTO: 0.3 %
BILIRUB SERPL-MCNC: 0.6 MG/DL (ref 0–1.2)
BUN SERPL-MCNC: 31 MG/DL (ref 6–23)
CALCIUM SERPL-MCNC: 9.3 MG/DL (ref 8.6–10.3)
CHLORIDE SERPL-SCNC: 96 MMOL/L (ref 98–107)
CO2 SERPL-SCNC: 28 MMOL/L (ref 21–32)
CREAT SERPL-MCNC: 1.11 MG/DL (ref 0.5–1.05)
EGFRCR SERPLBLD CKD-EPI 2021: 57 ML/MIN/1.73M*2
EOSINOPHIL # BLD AUTO: 0.06 X10*3/UL (ref 0–0.7)
EOSINOPHIL NFR BLD AUTO: 0.9 %
ERYTHROCYTE [DISTWIDTH] IN BLOOD BY AUTOMATED COUNT: 12.7 % (ref 11.5–14.5)
GLUCOSE SERPL-MCNC: 101 MG/DL (ref 74–99)
HCT VFR BLD AUTO: 38.3 % (ref 36–46)
HGB BLD-MCNC: 12.7 G/DL (ref 12–16)
IMM GRANULOCYTES # BLD AUTO: 0.01 X10*3/UL (ref 0–0.7)
IMM GRANULOCYTES NFR BLD AUTO: 0.2 % (ref 0–0.9)
LYMPHOCYTES # BLD AUTO: 2.4 X10*3/UL (ref 1.2–4.8)
LYMPHOCYTES NFR BLD AUTO: 37.7 %
MAGNESIUM SERPL-MCNC: 1.83 MG/DL (ref 1.6–2.4)
MCH RBC QN AUTO: 36.3 PG (ref 26–34)
MCHC RBC AUTO-ENTMCNC: 33.2 G/DL (ref 32–36)
MCV RBC AUTO: 109 FL (ref 80–100)
MONOCYTES # BLD AUTO: 1 X10*3/UL (ref 0.1–1)
MONOCYTES NFR BLD AUTO: 15.7 %
NEUTROPHILS # BLD AUTO: 2.87 X10*3/UL (ref 1.2–7.7)
NEUTROPHILS NFR BLD AUTO: 45.2 %
NRBC BLD-RTO: 0 /100 WBCS (ref 0–0)
PLATELET # BLD AUTO: 193 X10*3/UL (ref 150–450)
POTASSIUM SERPL-SCNC: 4.1 MMOL/L (ref 3.5–5.3)
PROT SERPL-MCNC: 7 G/DL (ref 6.4–8.2)
RBC # BLD AUTO: 3.5 X10*6/UL (ref 4–5.2)
SODIUM SERPL-SCNC: 132 MMOL/L (ref 136–145)
WBC # BLD AUTO: 6.4 X10*3/UL (ref 4.4–11.3)

## 2025-01-23 PROCEDURE — 80197 ASSAY OF TACROLIMUS: CPT

## 2025-01-23 PROCEDURE — 86832 HLA CLASS I HIGH DEFIN QUAL: CPT

## 2025-01-23 PROCEDURE — 80053 COMPREHEN METABOLIC PANEL: CPT

## 2025-01-23 PROCEDURE — 83735 ASSAY OF MAGNESIUM: CPT

## 2025-01-23 PROCEDURE — 85025 COMPLETE CBC W/AUTO DIFF WBC: CPT

## 2025-01-23 PROCEDURE — 86833 HLA CLASS II HIGH DEFIN QUAL: CPT

## 2025-01-23 PROCEDURE — 82784 ASSAY IGA/IGD/IGG/IGM EACH: CPT

## 2025-01-24 LAB
CMV DNA SERPL NAA+PROBE-LOG IU: NORMAL {LOG_IU}/ML
HLA RESULTS: NORMAL
HLA-A+B+C AB NFR SER: NORMAL %
HLA-DP+DQ+DR AB NFR SER: NORMAL %
IGG SERPL-MCNC: 907 MG/DL (ref 700–1600)
LABORATORY COMMENT REPORT: NOT DETECTED
TACROLIMUS BLD-MCNC: 5.5 NG/ML

## 2025-01-25 ENCOUNTER — HOSPITAL ENCOUNTER (OUTPATIENT)
Dept: RADIOLOGY | Facility: CLINIC | Age: 60
Discharge: HOME | End: 2025-01-25
Payer: COMMERCIAL

## 2025-01-25 DIAGNOSIS — Z94.2 LUNG TRANSPLANTED (MULTI): ICD-10-CM

## 2025-01-25 PROCEDURE — 77080 DXA BONE DENSITY AXIAL: CPT

## 2025-01-25 PROCEDURE — 77080 DXA BONE DENSITY AXIAL: CPT | Performed by: RADIOLOGY

## 2025-01-27 ENCOUNTER — TELEPHONE (OUTPATIENT)
Dept: TRANSPLANT | Facility: HOSPITAL | Age: 60
End: 2025-01-27
Payer: COMMERCIAL

## 2025-01-27 DIAGNOSIS — Z94.2 LUNG TRANSPLANTED (MULTI): ICD-10-CM

## 2025-01-27 RX ORDER — ATENOLOL 25 MG/1
25 TABLET ORAL DAILY
Qty: 90 TABLET | Refills: 3 | Status: SHIPPED | OUTPATIENT
Start: 2025-01-27 | End: 2026-01-27

## 2025-01-27 NOTE — TELEPHONE ENCOUNTER
----- Message from Daniel Tan sent at 1/27/2025 10:43 AM EST -----  agrree  ----- Message -----  From: Stephanie Mi RN  Sent: 1/24/2025  11:25 AM EST  To: Daniel Tan DO    Labs reviewed on 1/24/25  WBC 6.4 ANC 2.87  Hbg 12.7  Platelets 193  Creatinine 1.11  Magnesium 1.83  IgG pending  Prograf 5.5 Goal 5-7 Dose 2mg BID  HLA pending    -Changes made: None  -Labs due next 2/24

## 2025-01-27 NOTE — TELEPHONE ENCOUNTER
Called and spoke with patient in regards to labs and when to repeat. Patient verbalized understanding and stated she would need a refill for atenolol. Refill sent to Dr. Tan, no other concerns at this time.

## 2025-02-17 ENCOUNTER — PATIENT MESSAGE (OUTPATIENT)
Dept: TRANSPLANT | Facility: HOSPITAL | Age: 60
End: 2025-02-17
Payer: COMMERCIAL

## 2025-02-17 DIAGNOSIS — Z94.2 LUNG TRANSPLANT RECIPIENT (MULTI): ICD-10-CM

## 2025-02-17 RX ORDER — MYCOPHENOLATE MOFETIL 500 MG/1
1000 TABLET ORAL 2 TIMES DAILY
Qty: 360 TABLET | Refills: 3 | Status: SHIPPED | OUTPATIENT
Start: 2025-02-17 | End: 2026-02-17

## 2025-02-24 DIAGNOSIS — Z94.2 LUNG TRANSPLANTED (MULTI): ICD-10-CM

## 2025-03-04 ENCOUNTER — LAB (OUTPATIENT)
Dept: LAB | Facility: HOSPITAL | Age: 60
End: 2025-03-04
Payer: COMMERCIAL

## 2025-03-04 DIAGNOSIS — Z94.2 LUNG TRANSPLANT STATUS: Primary | ICD-10-CM

## 2025-03-04 LAB
ALBUMIN SERPL BCP-MCNC: 4.6 G/DL (ref 3.4–5)
ALP SERPL-CCNC: 52 U/L (ref 33–136)
ALT SERPL W P-5'-P-CCNC: 18 U/L (ref 7–45)
ANION GAP SERPL CALC-SCNC: 12 MMOL/L (ref 10–20)
AST SERPL W P-5'-P-CCNC: 19 U/L (ref 9–39)
BASOPHILS # BLD AUTO: 0.02 X10*3/UL (ref 0–0.1)
BASOPHILS NFR BLD AUTO: 0.4 %
BILIRUB SERPL-MCNC: 0.6 MG/DL (ref 0–1.2)
BUN SERPL-MCNC: 21 MG/DL (ref 6–23)
CALCIUM SERPL-MCNC: 9.7 MG/DL (ref 8.6–10.6)
CHLORIDE SERPL-SCNC: 96 MMOL/L (ref 98–107)
CO2 SERPL-SCNC: 27 MMOL/L (ref 21–32)
CREAT SERPL-MCNC: 0.91 MG/DL (ref 0.5–1.05)
EGFRCR SERPLBLD CKD-EPI 2021: 71 ML/MIN/1.73M*2
EOSINOPHIL # BLD AUTO: 0.04 X10*3/UL (ref 0–0.7)
EOSINOPHIL NFR BLD AUTO: 0.7 %
ERYTHROCYTE [DISTWIDTH] IN BLOOD BY AUTOMATED COUNT: 12.9 % (ref 11.5–14.5)
GLUCOSE SERPL-MCNC: 124 MG/DL (ref 74–99)
HCT VFR BLD AUTO: 38.8 % (ref 36–46)
HGB BLD-MCNC: 12.4 G/DL (ref 12–16)
IMM GRANULOCYTES # BLD AUTO: 0.01 X10*3/UL (ref 0–0.7)
IMM GRANULOCYTES NFR BLD AUTO: 0.2 % (ref 0–0.9)
LYMPHOCYTES # BLD AUTO: 1.67 X10*3/UL (ref 1.2–4.8)
LYMPHOCYTES NFR BLD AUTO: 30.7 %
MAGNESIUM SERPL-MCNC: 1.69 MG/DL (ref 1.6–2.4)
MCH RBC QN AUTO: 35.8 PG (ref 26–34)
MCHC RBC AUTO-ENTMCNC: 32 G/DL (ref 32–36)
MCV RBC AUTO: 112 FL (ref 80–100)
MONOCYTES # BLD AUTO: 0.62 X10*3/UL (ref 0.1–1)
MONOCYTES NFR BLD AUTO: 11.4 %
NEUTROPHILS # BLD AUTO: 3.08 X10*3/UL (ref 1.2–7.7)
NEUTROPHILS NFR BLD AUTO: 56.6 %
NRBC BLD-RTO: 0 /100 WBCS (ref 0–0)
PLATELET # BLD AUTO: 203 X10*3/UL (ref 150–450)
POTASSIUM SERPL-SCNC: 4.1 MMOL/L (ref 3.5–5.3)
PROT SERPL-MCNC: 6.7 G/DL (ref 6.4–8.2)
RBC # BLD AUTO: 3.46 X10*6/UL (ref 4–5.2)
SODIUM SERPL-SCNC: 131 MMOL/L (ref 136–145)
WBC # BLD AUTO: 5.4 X10*3/UL (ref 4.4–11.3)

## 2025-03-04 PROCEDURE — 82784 ASSAY IGA/IGD/IGG/IGM EACH: CPT

## 2025-03-04 PROCEDURE — 85025 COMPLETE CBC W/AUTO DIFF WBC: CPT

## 2025-03-04 PROCEDURE — 83735 ASSAY OF MAGNESIUM: CPT

## 2025-03-04 PROCEDURE — 80197 ASSAY OF TACROLIMUS: CPT

## 2025-03-04 PROCEDURE — 80053 COMPREHEN METABOLIC PANEL: CPT

## 2025-03-05 ENCOUNTER — TELEPHONE (OUTPATIENT)
Dept: TRANSPLANT | Facility: HOSPITAL | Age: 60
End: 2025-03-05
Payer: COMMERCIAL

## 2025-03-05 DIAGNOSIS — Z94.2 LUNG TRANSPLANTED (MULTI): ICD-10-CM

## 2025-03-05 LAB
CMV DNA SERPL NAA+PROBE-LOG IU: NORMAL {LOG_IU}/ML
IGG SERPL-MCNC: 895 MG/DL (ref 700–1600)
LABORATORY COMMENT REPORT: NOT DETECTED
TACROLIMUS BLD-MCNC: 6.7 NG/ML

## 2025-03-05 NOTE — TELEPHONE ENCOUNTER
----- Message from Nurse Trinidad ZAMORA sent at 3/5/2025  2:11 PM EST -----    ----- Message -----  From: Daniel Tan DO  Sent: 3/5/2025   2:01 PM EST  To: Trinidad Martinez RN    Reviewed, no change  ----- Message -----  From: Lab, Background User  Sent: 3/4/2025  10:29 PM EST  To: Daniel Tan DO

## 2025-03-05 NOTE — TELEPHONE ENCOUNTER
Labs reviewed on 3/5/25  WBC 5.4 ANC 3.08  Hbg 12.4  Platelets 203  Creatinine 0.91  Magnesium 1.69  Potassium 4.1  IgG pending  Prograf 6.7 Goal 5-7 Dose 2mg BID    -Changes made: None  -Labs due next 4/7     Statement Selected

## 2025-03-30 DIAGNOSIS — Z94.2 LUNG TRANSPLANTED (MULTI): ICD-10-CM

## 2025-03-31 RX ORDER — AZITHROMYCIN 250 MG/1
250 TABLET, FILM COATED ORAL DAILY
Qty: 90 TABLET | Refills: 3 | Status: SHIPPED | OUTPATIENT
Start: 2025-03-31

## 2025-03-31 NOTE — PROGRESS NOTES
Patient seen in clinic today and states she is doing well overall. Patient inquiring about RSV vaccine, she will check with insurance if it is covered. No other concerns at this time. Patient to return to clinic in 3 months with cr and cxray.     Review of Systems   Constitutional: Negative.   HENT: Negative.     Eyes:         Cataract surgery 2 weeks ago   Cardiovascular: Negative.    Respiratory: Negative.     Skin: Negative.    Musculoskeletal: Negative.    Gastrointestinal: Negative.    Genitourinary: Negative.    Neurological: Negative.    Psychiatric/Behavioral: Negative.        Labs reviewed on 3/5/25  WBC 5.4 ANC 3.08  Hbg 12.4  Platelets 203  Creatinine 0.91  Magnesium 1.69  Potassium 4.1  IgG pending  Prograf 6.7 Goal 5-7 Dose 2mg BID     -Changes made: None  -Labs due next 5/5/25; bi-monthly intervals    Routine Care Addressed:   -Vaccines:  -->2024 COVID 10/2024  --> 2024 Flu 10/2024  -->RSV DUE  -Colonoscopy: 7/23/18  -DEXA Scan: 3/4/2025  -Mamm: 3/4/2025  -Gyn: DUE  -Dentist: 4/1/2025  -Dermatology: twice yearly, follows regularly

## 2025-04-01 ENCOUNTER — OFFICE VISIT (OUTPATIENT)
Dept: TRANSPLANT | Facility: HOSPITAL | Age: 60
End: 2025-04-01
Payer: COMMERCIAL

## 2025-04-01 ENCOUNTER — HOSPITAL ENCOUNTER (OUTPATIENT)
Dept: RESPIRATORY THERAPY | Facility: HOSPITAL | Age: 60
Discharge: HOME | End: 2025-04-01
Payer: COMMERCIAL

## 2025-04-01 VITALS
OXYGEN SATURATION: 97 % | BODY MASS INDEX: 28.61 KG/M2 | HEART RATE: 70 BPM | TEMPERATURE: 98.4 F | SYSTOLIC BLOOD PRESSURE: 133 MMHG | HEIGHT: 66 IN | WEIGHT: 178 LBS | DIASTOLIC BLOOD PRESSURE: 85 MMHG

## 2025-04-01 DIAGNOSIS — Z94.2 LUNG TRANSPLANTED (MULTI): ICD-10-CM

## 2025-04-01 DIAGNOSIS — Z94.2 LUNG TRANSPLANTED (MULTI): Primary | ICD-10-CM

## 2025-04-01 PROCEDURE — 3079F DIAST BP 80-89 MM HG: CPT | Performed by: INTERNAL MEDICINE

## 2025-04-01 PROCEDURE — 99215 OFFICE O/P EST HI 40 MIN: CPT | Performed by: INTERNAL MEDICINE

## 2025-04-01 PROCEDURE — 3008F BODY MASS INDEX DOCD: CPT | Performed by: INTERNAL MEDICINE

## 2025-04-01 PROCEDURE — 94010 BREATHING CAPACITY TEST: CPT

## 2025-04-01 PROCEDURE — 3075F SYST BP GE 130 - 139MM HG: CPT | Performed by: INTERNAL MEDICINE

## 2025-04-01 ASSESSMENT — ENCOUNTER SYMPTOMS
CONSTITUTIONAL NEGATIVE: 1
RESPIRATORY NEGATIVE: 1
GASTROINTESTINAL NEGATIVE: 1
ALLERGIC/IMMUNOLOGIC NEGATIVE: 1
ENDOCRINE NEGATIVE: 1
HEMATOLOGIC/LYMPHATIC NEGATIVE: 1
NEUROLOGICAL NEGATIVE: 1
PSYCHIATRIC NEGATIVE: 1
CARDIOVASCULAR NEGATIVE: 1
MUSCULOSKELETAL NEGATIVE: 1
EYES NEGATIVE: 1

## 2025-04-01 ASSESSMENT — PAIN SCALES - GENERAL: PAINLEVEL_OUTOF10: 0-NO PAIN

## 2025-04-01 NOTE — PATIENT INSTRUCTIONS
Thank you for coming in to clinic today, it was nice to see you.     Today we discussed the following:  - overall wellbeing    Medication Changes:  - No changes    Please get labs 5/5/2025; bi-monthly labs moving forward.    Your next bronchoscopy is only as needed.    Your follow up is 7/1/2025.    Please see the dermatologist twice per year.    As a friendly reminder:  - Call the office if you develop any symptoms including but not limited to fevers, chills, nausea, vomiting, aches, shortness of breath, or coughing  - Do not let your medications run out, try to maintain at least a 1 week supply at all times. Call the office early if refills are needed as these can be difficult to obtain on weekends  - Please keep all your appointments  -Prior to your bronchoscopy, please hold your aspirin and/or Eliquis for three days prior to the date of procedure.     As always, call the office at [phone] or [phone] for any issues.     Out of hours, please call the Cardiothoracic Transplant On-Call [phone] for emergencies

## 2025-04-01 NOTE — PROGRESS NOTES
Chief Complaint:  60 y.o.  y/o M with history of s/p LTx . who was last seen on 6/25/2024 and presents today for: Follow-up transplant.       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 2.64 P   FVC - PRE 3.02 P   FEV1 - Pre 1.99        Current Immunosuppression:   -Prograf, Cellcept, Prednisone    Interval Medical History:  Last seen 6/25/2024. Since his last visit, no complaints.     Patient Active Problem List   Diagnosis    Transplant    History of lung transplant (Multi)    Status post right breast biopsy    Plantar fascial fibromatosis    Pain in both feet    Other seborrheic keratosis    Osteopenia    Long-term use of immunosuppressant medication    Hypomagnesemia    Hypertension    Benign essential hypertension    Chronic rhinitis    Common migraine without aura    Cystic breast    Fibrocystic disease of right breast    Hammer toe of left foot    Melanocytic nevi of trunk    Melanocytic nevi of unspecified part of face            Meds    Current Outpatient Medications:     albuterol 90 mcg/actuation inhaler, Inhale 2 puffs every 6 hours if needed for wheezing., Disp: 18 g, Rfl: 11    alendronate (Fosamax) 70 mg tablet, Take 1 tablet (70 mg) by mouth 1 (one) time per week., Disp: , Rfl:     atenolol (Tenormin) 25 mg tablet, Take 1 tablet (25 mg) by mouth once daily., Disp: 90 tablet, Rfl: 3    azithromycin (Zithromax) 250 mg tablet, TAKE 1 TABLET ONCE DAILY, Disp: 90 tablet, Rfl: 3    ciprofloxacin-dexamethasone (Ciprodex) otic suspension, Administer 4 drops into each ear 2 times a day. For up to 2 weeks as needed for scaling/itching, Disp: 7.5 mL, Rfl: 3    dapsone 100 mg  tablet, TAKE 1 TABLET ON MONDAY, WEDNESDAY, AND FRIDAY, Disp: 40 tablet, Rfl: 3    eletriptan (Relpax) 20 mg tablet, Take 1 tablet (20 mg) by mouth 1 time if needed for migraine (Migranes). (Patient not taking: Reported on 1/7/2025), Disp: 6 tablet, Rfl: 3    ergocalciferol, vitamin D2, (VITAMIN D2 ORAL), Take by mouth., Disp: , Rfl:     fluticasone (Flonase) 50 mcg/actuation nasal spray, USE 1 SPRAY IN EACH NOSTRIL DAILY, Disp: 144 g, Rfl: 5    folic acid (Folvite) 1 mg tablet, TAKE 1 TABLET DAILY, Disp: 90 tablet, Rfl: 3    gentamicin (Garamycin) 0.1 % ointment, Apply to the affected area twice daily for 2-3 months, Disp: 30 g, Rfl: 0    loratadine (Claritin) 10 mg tablet, Take 1 tablet (10 mg) by mouth once daily., Disp: , Rfl:     losartan (Cozaar) 100 mg tablet, TAKE 1 TABLET DAILY, Disp: 90 tablet, Rfl: 3    mycophenolate (Cellcept) 500 mg tablet, Take 2 tablets (1,000 mg) by mouth 2 times a day., Disp: 360 tablet, Rfl: 3    predniSONE (Deltasone) 1 mg tablet, TAKE 3 TABLETS DAILY, Disp: 270 tablet, Rfl: 3    tacrolimus (Prograf) 1 mg capsule, Take 2 capsules (2 mg) by mouth every 12 hours., Disp: 120 capsule, Rfl: 11    valACYclovir (Valtrex) 500 mg tablet, TAKE 1 TABLET DAILY, Disp: 90 tablet, Rfl: 3     There were no vitals filed for this visit.     Physical Exam  Constitutional:       Appearance: Normal appearance.   HENT:      Head: Normocephalic.   Cardiovascular:      Rate and Rhythm: Normal rate and regular rhythm.      Heart sounds: Normal heart sounds.   Pulmonary:      Breath sounds: Normal breath sounds.   Abdominal:      General: Bowel sounds are normal.      Palpations: Abdomen is soft.   Musculoskeletal:      Right lower leg: No edema.      Left lower leg: No edema.   Skin:     Findings: No rash.   Psychiatric:         Mood and Affect: Mood normal.         Judgment: Judgment normal.        Review of Systems  Review of Systems   Constitutional: Negative.    HENT: Negative.     Eyes: Negative.     Respiratory: Negative.     Cardiovascular: Negative.    Gastrointestinal: Negative.    Endocrine: Negative.    Genitourinary: Negative.    Musculoskeletal: Negative.    Skin: Negative.    Allergic/Immunologic: Negative.    Neurological: Negative.    Hematological: Negative.    Psychiatric/Behavioral: Negative.     All other systems reviewed and are negative.      Lab Results 3/4/2025  Tac = 5.5   CMV (+) but <35-> UNDETECTABLE  Cholesterol 216 non-fasting  IgG = 895  Na = 131  Hb =12.4  ALC =1670        ASSESSMENT / PLAN:       1. Pulmonary-s/p DLTx on for Familial IPF [facility] 1999.     Allograft function:  -PFT's, stable without evidence of CLAD  -October HLA resulted with weak ab not previously seen but weak and of unclear clinical significance. Since she is so far out, unable to determine if DSA or not. Stable PFTS.  January with CLASS II (WEAK DQA1*03:01). Currenetly Will trend. PFTs stable.     Immunosuppression  -Prograf, goal 5-7  -Cellcept 1000 mg BID  -Prednisone 5mg daily.    Infectious Prophylaxis  -Bactrim for PJP prophylaxis  -CMV -/+, continue Valcyte 450 mg daily, will continue to follow weekly CMV PCR's closely.  -Voriconazole 200 mg BID for anti-fungal prophylaxis, plan to continue for minimum of 4 month course, will continue to follow LFTs closely.  Stable    2) Cardiovascular  a. Hypertension - now under control, interval rebalancing of meds, now resolved with no edema.  b. Raynauds        3) Renal/  a. Clinically with euvolemic hyponatremia in past . Recent transient 126 associated with known salt restriction + high water intake. Now normal.    4) GI    5) Endocrine - perimenopausal.    6) Neuropsych    7) Musculoskeletal    8) Heme-Onc    9) Prophylaxis/Health Maintenance  A. Vaccinations Influenza/Pneumococcal/COVID vaccines.   B. Cancer screening  1. Pap- 3/2023  2. Rafael- 3/2023  3. Colonoscopy- Done on 07/23/18. Recommended repeat in 10 years?  C. Dexa scan- Done on 1/12/22 due  1/22/2024  D. Dermatology: 12/2024          Plans:  1) Follow up 3 months with CXR, cr. Will change if increasing ab.   2) Repeat HLA next draw with quantitation.   3) Health maintenance needed  A. Mammo  B. Pap  C. Dexa  D. Derm due mid 2025.

## 2025-04-04 LAB
MGC ASCENT PFT - FEV1 - PRE: 1.98
MGC ASCENT PFT - FEV1 - PREDICTED: 2.6
MGC ASCENT PFT - FVC - PRE: 2.88
MGC ASCENT PFT - FVC - PREDICTED: 3.31

## 2025-04-07 DIAGNOSIS — Z94.2 LUNG TRANSPLANTED (MULTI): ICD-10-CM

## 2025-04-26 ENCOUNTER — PATIENT MESSAGE (OUTPATIENT)
Dept: TRANSPLANT | Facility: HOSPITAL | Age: 60
End: 2025-04-26
Payer: COMMERCIAL

## 2025-05-02 DIAGNOSIS — Z94.2 LUNG TRANSPLANT RECIPIENT (MULTI): ICD-10-CM

## 2025-05-02 RX ORDER — PREDNISONE 1 MG/1
3 TABLET ORAL DAILY
Qty: 270 TABLET | Refills: 3 | Status: SHIPPED | OUTPATIENT
Start: 2025-05-02

## 2025-05-20 ENCOUNTER — APPOINTMENT (OUTPATIENT)
Dept: LAB | Facility: HOSPITAL | Age: 60
End: 2025-05-20
Payer: COMMERCIAL

## 2025-05-20 ENCOUNTER — LAB (OUTPATIENT)
Dept: LAB | Facility: HOSPITAL | Age: 60
End: 2025-05-20
Payer: COMMERCIAL

## 2025-05-20 DIAGNOSIS — Z94.2 LUNG TRANSPLANT STATUS: Primary | ICD-10-CM

## 2025-05-20 LAB
25(OH)D3 SERPL-MCNC: 50 NG/ML (ref 30–100)
ALBUMIN SERPL BCP-MCNC: 4.6 G/DL (ref 3.4–5)
ALP SERPL-CCNC: 51 U/L (ref 33–136)
ALT SERPL W P-5'-P-CCNC: 20 U/L (ref 7–45)
ANION GAP SERPL CALC-SCNC: 12 MMOL/L (ref 10–20)
AST SERPL W P-5'-P-CCNC: 20 U/L (ref 9–39)
BILIRUB SERPL-MCNC: 0.8 MG/DL (ref 0–1.2)
BUN SERPL-MCNC: 21 MG/DL (ref 6–23)
CALCIUM SERPL-MCNC: 9.5 MG/DL (ref 8.6–10.6)
CHLORIDE SERPL-SCNC: 97 MMOL/L (ref 98–107)
CHOLEST SERPL-MCNC: 214 MG/DL (ref 0–199)
CHOLESTEROL/HDL RATIO: 3.1
CO2 SERPL-SCNC: 26 MMOL/L (ref 21–32)
CREAT SERPL-MCNC: 0.92 MG/DL (ref 0.5–1.05)
EGFRCR SERPLBLD CKD-EPI 2021: 71 ML/MIN/1.73M*2
GLUCOSE SERPL-MCNC: 91 MG/DL (ref 74–99)
HDLC SERPL-MCNC: 68.6 MG/DL
LDLC SERPL CALC-MCNC: 131 MG/DL
MAGNESIUM SERPL-MCNC: 1.74 MG/DL (ref 1.6–2.4)
NON HDL CHOLESTEROL: 145 MG/DL (ref 0–149)
POTASSIUM SERPL-SCNC: 3.8 MMOL/L (ref 3.5–5.3)
PROT SERPL-MCNC: 6.7 G/DL (ref 6.4–8.2)
SODIUM SERPL-SCNC: 131 MMOL/L (ref 136–145)
TRIGL SERPL-MCNC: 74 MG/DL (ref 0–149)
VLDL: 15 MG/DL (ref 0–40)

## 2025-05-20 PROCEDURE — 80061 LIPID PANEL: CPT

## 2025-05-20 PROCEDURE — 86832 HLA CLASS I HIGH DEFIN QUAL: CPT

## 2025-05-20 PROCEDURE — 80197 ASSAY OF TACROLIMUS: CPT

## 2025-05-20 PROCEDURE — 85060 BLOOD SMEAR INTERPRETATION: CPT

## 2025-05-20 PROCEDURE — 36415 COLL VENOUS BLD VENIPUNCTURE: CPT

## 2025-05-20 PROCEDURE — 86833 HLA CLASS II HIGH DEFIN QUAL: CPT

## 2025-05-20 PROCEDURE — 82784 ASSAY IGA/IGD/IGG/IGM EACH: CPT

## 2025-05-20 PROCEDURE — 85025 COMPLETE CBC W/AUTO DIFF WBC: CPT

## 2025-05-20 PROCEDURE — 83735 ASSAY OF MAGNESIUM: CPT

## 2025-05-20 PROCEDURE — 82306 VITAMIN D 25 HYDROXY: CPT

## 2025-05-20 PROCEDURE — 80053 COMPREHEN METABOLIC PANEL: CPT

## 2025-05-20 PROCEDURE — 87799 DETECT AGENT NOS DNA QUANT: CPT

## 2025-05-21 LAB
BASOPHILS # BLD AUTO: 0.01 X10*3/UL (ref 0–0.1)
BASOPHILS NFR BLD AUTO: 0.2 %
CMV DNA SERPL NAA+PROBE-LOG IU: NORMAL {LOG_IU}/ML
EBV DNA SPEC NAA+PROBE-LOG#: NORMAL {LOG_COPIES}/ML
EOSINOPHIL # BLD AUTO: 0.04 X10*3/UL (ref 0–0.7)
EOSINOPHIL NFR BLD AUTO: 0.9 %
ERYTHROCYTE [DISTWIDTH] IN BLOOD BY AUTOMATED COUNT: 12.8 % (ref 11.5–14.5)
HCT VFR BLD AUTO: 34.3 % (ref 36–46)
HGB BLD-MCNC: 11.5 G/DL (ref 12–16)
IGG SERPL-MCNC: 836 MG/DL (ref 700–1600)
IMM GRANULOCYTES # BLD AUTO: 0.01 X10*3/UL (ref 0–0.7)
IMM GRANULOCYTES NFR BLD AUTO: 0.2 % (ref 0–0.9)
LABORATORY COMMENT REPORT: NOT DETECTED
LABORATORY COMMENT REPORT: NOT DETECTED
LYMPHOCYTES # BLD AUTO: 1.6 X10*3/UL (ref 1.2–4.8)
LYMPHOCYTES NFR BLD AUTO: 35.6 %
MCH RBC QN AUTO: 38 PG (ref 26–34)
MCHC RBC AUTO-ENTMCNC: 33.5 G/DL (ref 32–36)
MCV RBC AUTO: 113 FL (ref 80–100)
MONOCYTES # BLD AUTO: 0.61 X10*3/UL (ref 0.1–1)
MONOCYTES NFR BLD AUTO: 13.6 %
NEUTROPHILS # BLD AUTO: 2.22 X10*3/UL (ref 1.2–7.7)
NEUTROPHILS NFR BLD AUTO: 49.5 %
NRBC BLD-RTO: 0 /100 WBCS (ref 0–0)
PATH REVIEW-CBC DIFFERENTIAL: NORMAL
PLATELET # BLD AUTO: 193 X10*3/UL (ref 150–450)
RBC # BLD AUTO: 3.03 X10*6/UL (ref 4–5.2)
TACROLIMUS BLD-MCNC: 5.5 NG/ML
WBC # BLD AUTO: 4.5 X10*3/UL (ref 4.4–11.3)

## 2025-05-22 ENCOUNTER — PATIENT MESSAGE (OUTPATIENT)
Dept: TRANSPLANT | Facility: HOSPITAL | Age: 60
End: 2025-05-22
Payer: COMMERCIAL

## 2025-05-23 LAB
HLA RESULTS: NORMAL
HLA-A+B+C AB NFR SER: NORMAL %
HLA-DP+DQ+DR AB NFR SER: NORMAL %
HOLD SPECIMEN: NORMAL

## 2025-06-01 DIAGNOSIS — Z94.2 LUNG TRANSPLANTED (MULTI): ICD-10-CM

## 2025-06-02 RX ORDER — FLUTICASONE PROPIONATE 50 MCG
2 SPRAY, SUSPENSION (ML) NASAL DAILY
Qty: 48 G | Refills: 3 | Status: SHIPPED | OUTPATIENT
Start: 2025-06-02 | End: 2026-06-02

## 2025-06-02 RX ORDER — DAPSONE 100 MG/1
100 TABLET ORAL 3 TIMES WEEKLY
Qty: 39 TABLET | Refills: 3 | Status: SHIPPED | OUTPATIENT
Start: 2025-06-02

## 2025-06-19 ENCOUNTER — TELEPHONE (OUTPATIENT)
Dept: TRANSPLANT | Facility: HOSPITAL | Age: 60
End: 2025-06-19
Payer: COMMERCIAL

## 2025-06-19 NOTE — TELEPHONE ENCOUNTER
"Returned call to patient after receiving voicemail about heading to the ER today for an abscess on her finger. Discussed with Dr. Tan, no abx is \"off-limits,\" but requested that patient call our office back with the name of the abx she is prescribed so that we can verify that there are no interactions with her other meds. Left voicemail for patient with above information.   "

## 2025-06-19 NOTE — TELEPHONE ENCOUNTER
Returned patient's call, she mentions that the provider at [facility] is recommending that she be prescribed Augmentin. Confirmed that this medication is safe to take with Dr. Tan. Educated patient that if the abscess worsens or is not getting any better, she should call our office immediately. Patient verbalized understanding.

## 2025-07-01 ENCOUNTER — APPOINTMENT (OUTPATIENT)
Dept: TRANSPLANT | Facility: HOSPITAL | Age: 60
End: 2025-07-01
Payer: COMMERCIAL

## 2025-07-01 ENCOUNTER — APPOINTMENT (OUTPATIENT)
Dept: RESPIRATORY THERAPY | Facility: HOSPITAL | Age: 60
End: 2025-07-01
Payer: COMMERCIAL

## 2025-07-07 DIAGNOSIS — Z94.2 LUNG TRANSPLANTED (MULTI): ICD-10-CM

## 2025-07-08 ENCOUNTER — HOSPITAL ENCOUNTER (OUTPATIENT)
Dept: RESPIRATORY THERAPY | Facility: HOSPITAL | Age: 60
Discharge: HOME | End: 2025-07-08
Payer: COMMERCIAL

## 2025-07-08 ENCOUNTER — LAB (OUTPATIENT)
Dept: LAB | Facility: HOSPITAL | Age: 60
End: 2025-07-08
Payer: COMMERCIAL

## 2025-07-08 ENCOUNTER — OFFICE VISIT (OUTPATIENT)
Dept: TRANSPLANT | Facility: HOSPITAL | Age: 60
End: 2025-07-08
Payer: COMMERCIAL

## 2025-07-08 VITALS
SYSTOLIC BLOOD PRESSURE: 129 MMHG | WEIGHT: 174 LBS | DIASTOLIC BLOOD PRESSURE: 85 MMHG | TEMPERATURE: 97.1 F | BODY MASS INDEX: 27.31 KG/M2 | OXYGEN SATURATION: 97 % | HEART RATE: 71 BPM | HEIGHT: 67 IN

## 2025-07-08 DIAGNOSIS — Z94.2 LUNG TRANSPLANT STATUS: Primary | ICD-10-CM

## 2025-07-08 DIAGNOSIS — Z94.2 LUNG TRANSPLANT RECIPIENT (MULTI): ICD-10-CM

## 2025-07-08 DIAGNOSIS — Z94.2 LUNG TRANSPLANTED (MULTI): ICD-10-CM

## 2025-07-08 DIAGNOSIS — Z79.52 CURRENT CHRONIC USE OF SYSTEMIC STEROIDS: ICD-10-CM

## 2025-07-08 LAB
ALBUMIN SERPL BCP-MCNC: 4.4 G/DL (ref 3.4–5)
ALP SERPL-CCNC: 47 U/L (ref 33–136)
ALT SERPL W P-5'-P-CCNC: 17 U/L (ref 7–45)
ANION GAP SERPL CALC-SCNC: 12 MMOL/L (ref 10–20)
AST SERPL W P-5'-P-CCNC: 17 U/L (ref 9–39)
BASOPHILS # BLD AUTO: 0.01 X10*3/UL (ref 0–0.1)
BASOPHILS NFR BLD AUTO: 0.2 %
BILIRUB SERPL-MCNC: 0.9 MG/DL (ref 0–1.2)
BUN SERPL-MCNC: 21 MG/DL (ref 6–23)
CALCIUM SERPL-MCNC: 9.3 MG/DL (ref 8.6–10.3)
CHLORIDE SERPL-SCNC: 99 MMOL/L (ref 98–107)
CO2 SERPL-SCNC: 28 MMOL/L (ref 21–32)
CREAT SERPL-MCNC: 0.82 MG/DL (ref 0.5–1.05)
EGFRCR SERPLBLD CKD-EPI 2021: 81 ML/MIN/1.73M*2
EOSINOPHIL # BLD AUTO: 0.04 X10*3/UL (ref 0–0.7)
EOSINOPHIL NFR BLD AUTO: 0.8 %
ERYTHROCYTE [DISTWIDTH] IN BLOOD BY AUTOMATED COUNT: 12.5 % (ref 11.5–14.5)
GLUCOSE SERPL-MCNC: 98 MG/DL (ref 74–99)
HCT VFR BLD AUTO: 37.8 % (ref 36–46)
HGB BLD-MCNC: 12.4 G/DL (ref 12–16)
IMM GRANULOCYTES # BLD AUTO: 0.02 X10*3/UL (ref 0–0.7)
IMM GRANULOCYTES NFR BLD AUTO: 0.4 % (ref 0–0.9)
LYMPHOCYTES # BLD AUTO: 1.39 X10*3/UL (ref 1.2–4.8)
LYMPHOCYTES NFR BLD AUTO: 29.5 %
MAGNESIUM SERPL-MCNC: 1.68 MG/DL (ref 1.6–2.4)
MCH RBC QN AUTO: 36.9 PG (ref 26–34)
MCHC RBC AUTO-ENTMCNC: 32.8 G/DL (ref 32–36)
MCV RBC AUTO: 113 FL (ref 80–100)
MGC ASCENT PFT - FEV1 - PRE: 2.07
MGC ASCENT PFT - FEV1 - PREDICTED: 2.59
MGC ASCENT PFT - FVC - PRE: 2.98
MGC ASCENT PFT - FVC - PREDICTED: 3.3
MONOCYTES # BLD AUTO: 0.63 X10*3/UL (ref 0.1–1)
MONOCYTES NFR BLD AUTO: 13.4 %
NEUTROPHILS # BLD AUTO: 2.62 X10*3/UL (ref 1.2–7.7)
NEUTROPHILS NFR BLD AUTO: 55.7 %
NRBC BLD-RTO: 0 /100 WBCS (ref 0–0)
PLATELET # BLD AUTO: 171 X10*3/UL (ref 150–450)
POTASSIUM SERPL-SCNC: 4.5 MMOL/L (ref 3.5–5.3)
PROT SERPL-MCNC: 6.5 G/DL (ref 6.4–8.2)
RBC # BLD AUTO: 3.36 X10*6/UL (ref 4–5.2)
SODIUM SERPL-SCNC: 134 MMOL/L (ref 136–145)
TACROLIMUS BLD-MCNC: 5 NG/ML
WBC # BLD AUTO: 4.7 X10*3/UL (ref 4.4–11.3)

## 2025-07-08 PROCEDURE — 85025 COMPLETE CBC W/AUTO DIFF WBC: CPT

## 2025-07-08 PROCEDURE — 80197 ASSAY OF TACROLIMUS: CPT

## 2025-07-08 PROCEDURE — 99215 OFFICE O/P EST HI 40 MIN: CPT | Performed by: INTERNAL MEDICINE

## 2025-07-08 PROCEDURE — 3008F BODY MASS INDEX DOCD: CPT | Performed by: INTERNAL MEDICINE

## 2025-07-08 PROCEDURE — 36415 COLL VENOUS BLD VENIPUNCTURE: CPT

## 2025-07-08 PROCEDURE — 80053 COMPREHEN METABOLIC PANEL: CPT

## 2025-07-08 PROCEDURE — 82784 ASSAY IGA/IGD/IGG/IGM EACH: CPT

## 2025-07-08 PROCEDURE — 3074F SYST BP LT 130 MM HG: CPT | Performed by: INTERNAL MEDICINE

## 2025-07-08 PROCEDURE — 1036F TOBACCO NON-USER: CPT | Performed by: INTERNAL MEDICINE

## 2025-07-08 PROCEDURE — 3079F DIAST BP 80-89 MM HG: CPT | Performed by: INTERNAL MEDICINE

## 2025-07-08 PROCEDURE — 83735 ASSAY OF MAGNESIUM: CPT

## 2025-07-08 PROCEDURE — 94010 BREATHING CAPACITY TEST: CPT

## 2025-07-08 PROCEDURE — 94010 BREATHING CAPACITY TEST: CPT | Performed by: INTERNAL MEDICINE

## 2025-07-08 RX ORDER — VALACYCLOVIR HYDROCHLORIDE 500 MG/1
500 TABLET, FILM COATED ORAL DAILY
Qty: 90 TABLET | Refills: 3 | Status: SHIPPED | OUTPATIENT
Start: 2025-07-08

## 2025-07-08 RX ORDER — TACROLIMUS 1 MG/1
2 CAPSULE ORAL EVERY 12 HOURS
Qty: 120 CAPSULE | Refills: 11 | Status: SHIPPED | OUTPATIENT
Start: 2025-07-08

## 2025-07-08 RX ORDER — MYCOPHENOLATE MOFETIL 500 MG/1
1000 TABLET ORAL 2 TIMES DAILY
Qty: 360 TABLET | Refills: 3 | Status: SHIPPED | OUTPATIENT
Start: 2025-07-08 | End: 2026-07-08

## 2025-07-08 RX ORDER — ALENDRONATE SODIUM 70 MG/1
70 TABLET ORAL
Qty: 12 TABLET | Refills: 3 | Status: SHIPPED | OUTPATIENT
Start: 2025-07-08

## 2025-07-08 RX ORDER — ALBUTEROL SULFATE 90 UG/1
2 INHALANT RESPIRATORY (INHALATION) EVERY 6 HOURS PRN
Qty: 18 G | Refills: 11 | Status: SHIPPED | OUTPATIENT
Start: 2025-07-08 | End: 2026-07-08

## 2025-07-08 ASSESSMENT — ENCOUNTER SYMPTOMS
BLURRED VISION: 1
NEUROLOGICAL NEGATIVE: 1
MUSCULOSKELETAL NEGATIVE: 1
SPUTUM PRODUCTION: 0
RESPIRATORY NEGATIVE: 1
GASTROINTESTINAL NEGATIVE: 1
COUGH: 1
PSYCHIATRIC NEGATIVE: 1
CARDIOVASCULAR NEGATIVE: 1
EYES NEGATIVE: 1
HEMATOLOGIC/LYMPHATIC NEGATIVE: 1
ALLERGIC/IMMUNOLOGIC NEGATIVE: 1
CONSTITUTIONAL NEGATIVE: 1
ENDOCRINE NEGATIVE: 1
PARESTHESIAS: 1

## 2025-07-08 ASSESSMENT — PAIN SCALES - GENERAL: PAINLEVEL_OUTOF10: 0-NO PAIN

## 2025-07-08 NOTE — PATIENT INSTRUCTIONS
Thank you for coming in to clinic today, it was nice to see you.     Today we discussed the following:  - We will call you with your labs results    Medication Changes:  - Refill for Albuterol, Alendronate, Valacyclovir, Tacrolimus, Mycophenolate sent to [facility]    Follow-up  - Return to clinic around 10/7/25 with spirometry and chest xray    As a friendly reminder:  - Call the office if you develop any symptoms including but not limited to fevers, chills, nausea, vomiting, aches, shortness of breath, or coughing  - Do not let your medications run out, try to maintain at least a 1 week supply at all times. Call the office early if refills are needed as these can be difficult to obtain on weekends  - Please keep all your appointments    As always, call the office at [phone] or [phone] for any issues during normal business hours    Out of hours, please call the Lung Transplant On-Call [phone] for emergencies

## 2025-07-08 NOTE — PROGRESS NOTES
Chief Complaint:  60 y.o.  y/o M with history of s/p LTx . who was last seen on 4/28/2025 and presents today for: Follow-up transplant.       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 2.64 P   FVC - PRE 3.02 P   FEV1 - Pre 1.99        Current Immunosuppression:   -Prograf, Cellcept, Prednisone    Interval Medical History:  Last seen 4/28/2025. Since his last visit, multiple, minor orthopedic complaints, has physicians following or that have seen her in past for these. Tree fell on house, no injuries. Trouble sleeping due to life stressors.     Patient Active Problem List   Diagnosis    Transplant    History of lung transplant (Multi)    Status post right breast biopsy    Plantar fascial fibromatosis    Pain in both feet    Other seborrheic keratosis    Osteopenia    Long-term use of immunosuppressant medication    Hypomagnesemia    Hypertension    Benign essential hypertension    Chronic rhinitis    Common migraine without aura    Cystic breast    Fibrocystic disease of right breast    Hammer toe of left foot    Melanocytic nevi of trunk    Melanocytic nevi of unspecified part of face            Meds    Current Outpatient Medications:     albuterol 90 mcg/actuation inhaler, Inhale 2 puffs every 6 hours if needed for wheezing., Disp: 18 g, Rfl: 11    alendronate (Fosamax) 70 mg tablet, Take 1 tablet (70 mg) by mouth 1 (one) time per week., Disp: , Rfl:     atenolol (Tenormin) 25 mg tablet, Take 1 tablet (25 mg) by mouth once daily., Disp: 90 tablet, Rfl: 3    azithromycin (Zithromax) 250 mg tablet, TAKE 1 TABLET ONCE DAILY, Disp: 90 tablet, Rfl: 3    dapsone 100 mg tablet, Take 1  tablet (100 mg) by mouth 3 times a week., Disp: 39 tablet, Rfl: 3    ergocalciferol, vitamin D2, (VITAMIN D2 ORAL), Take by mouth., Disp: , Rfl:     fluticasone (Flonase) 50 mcg/actuation nasal spray, Administer 2 sprays into each nostril once daily., Disp: 48 g, Rfl: 3    folic acid (Folvite) 1 mg tablet, TAKE 1 TABLET DAILY, Disp: 90 tablet, Rfl: 3    gentamicin (Garamycin) 0.1 % ointment, Apply to the affected area twice daily for 2-3 months, Disp: 30 g, Rfl: 0    loratadine (Claritin) 10 mg tablet, Take 1 tablet (10 mg) by mouth once daily., Disp: , Rfl:     losartan (Cozaar) 100 mg tablet, TAKE 1 TABLET DAILY, Disp: 90 tablet, Rfl: 3    mycophenolate (Cellcept) 500 mg tablet, Take 2 tablets (1,000 mg) by mouth 2 times a day., Disp: 360 tablet, Rfl: 3    predniSONE (Deltasone) 1 mg tablet, TAKE 3 TABLETS DAILY, Disp: 270 tablet, Rfl: 3    tacrolimus (Prograf) 1 mg capsule, Take 2 capsules (2 mg) by mouth every 12 hours., Disp: 120 capsule, Rfl: 11    valACYclovir (Valtrex) 500 mg tablet, TAKE 1 TABLET DAILY, Disp: 90 tablet, Rfl: 3    eletriptan (Relpax) 20 mg tablet, Take 1 tablet (20 mg) by mouth 1 time if needed for migraine (Migranes). (Patient not taking: Reported on 1/7/2025), Disp: 6 tablet, Rfl: 3     Vitals:    07/08/25 1044   BP: 129/85   Pulse: 71   Temp: 36.2 °C (97.1 °F)   SpO2: 97%        Physical Exam  Constitutional:       Appearance: Normal appearance.   HENT:      Head: Normocephalic.   Cardiovascular:      Rate and Rhythm: Normal rate and regular rhythm.      Heart sounds: Normal heart sounds.   Pulmonary:      Breath sounds: Normal breath sounds.   Abdominal:      General: Bowel sounds are normal.      Palpations: Abdomen is soft.   Musculoskeletal:      Right lower leg: No edema.      Left lower leg: No edema.   Skin:     Findings: No rash.   Psychiatric:         Mood and Affect: Mood normal.         Judgment: Judgment normal.          Review of Systems  Review of Systems   Constitutional:  Negative.    HENT: Negative.     Eyes: Negative.    Respiratory: Negative.     Cardiovascular: Negative.    Gastrointestinal: Negative.    Endocrine: Negative.    Genitourinary: Negative.    Musculoskeletal: Negative.    Skin: Negative.    Allergic/Immunologic: Negative.    Neurological: Negative.    Hematological: Negative.    Psychiatric/Behavioral: Negative.     All other systems reviewed and are negative.      Lab Results 7/8/2025 (pending)  Pending    ASSESSMENT / PLAN:       1. Pulmonary-s/p DLTx on for Familial IPF [facility] 1999.     Allograft function:  -PFT's, stable without evidence of CLAD  -October HLA resulted with weak ab not previously seen but weak and of unclear clinical significance. Since she is so far out, unable to determine if DSA or not. Stable PFTS.  5/20/2025 CLASS II (WEAK DQA1*03:01). Currenetly Will trend. PFTs stable.     Immunosuppression  -Prograf, goal 5-7  -Cellcept 1000 mg BID  -Prednisone 5mg daily.    Infectious Prophylaxis  -Bactrim for PJP prophylaxis  -CMV -/+, continue Valcyte 450 mg daily, will continue to follow weekly CMV PCR's closely.  -Voriconazole 200 mg BID for anti-fungal prophylaxis, plan to continue for minimum of 4 month course, will continue to follow LFTs closely.  Stable    2) Cardiovascular  a. Hypertension - now under control, interval rebalancing of meds, now resolved with no edema.  b. Raynauds    3) Renal/  a. Clinically with euvolemic hyponatremia in past . Recent transient 126 associated with known salt restriction + high water intake. Now normal.    4) GI    5) Endocrine - perimenopausal.    6) Neuropsych    7) Musculoskeletal  A. Fasciitis  B. Rotator cuff  C. Bunions      8) Heme-Onc    9) Prophylaxis/Health Maintenance  A. Vaccinations Influenza/Pneumococcal/COVID vaccines.   B. Cancer screening  1. Pap- 3/2023  2. Rafael- 3/2023  3. Colonoscopy- Done on 07/23/18. Recommended repeat in 10 years?  C. Dexa scan- Done on 1/12/22 due 1/22/2024  D.  Dermatology: 12/2024          Plans:  1) Follow up 3 months with CXR, cr. Will change if increasing ab.   2) Repeat HLA next draw with quantitation.   3) Health maintenance needed  A. Mammo  B. Pap  C. Dexa  D. Derm due mid 2025.  4) Follow up ortho, podiatry as needed. IF additional issues with numbness ,can refer to neuro.

## 2025-07-08 NOTE — PROGRESS NOTES
Patient seen in clinic today and states she is doing okay. Since early June, she has had a dry cough, no sputum production. R middle finger abscess, [facility] and seems to have gotten better, still some inflammation. Patient has started a new job and recently had a tree fall on her house, patient has been under a lot of stress. Patient states fasciitis in BL feet have been bad, numbness in 2nd and 3rd on L foot, R heel has the most pain. Pain in R shoulder from previous rotator cuff surgery.  Patient stated she is not sleeping well due to dry cough. States sometimes she feels like her BP is low, does not take vitals at home. Occasional episodes of being lightheaded and she will sit down and recover. [facility]. Patient to return to clinic in 3 months with cr and cxray. Bronchoscopy PRN. Labs pending from today.     Review of Systems   Constitutional: Positive for malaise/fatigue.   HENT: Negative.     Eyes:  Positive for blurred vision (L eye, cataracts surgery).   Cardiovascular: Negative.    Respiratory:  Positive for cough. Negative for sputum production.    Skin: Negative.    Musculoskeletal: Negative.    Gastrointestinal: Negative.    Genitourinary: Negative.    Neurological:  Positive for paresthesias (BL feet).   Psychiatric/Behavioral: Negative.        Routine Care Addressed:   -Vaccines:  -->COVID 10/30/2024  -->Flu 10/30/2024  -->RSV DUE - will get in September  -->PNA 5/19/25  -->Tdap 2/17/25  -Colonoscopy: 7/23/18  -DEXA Scan: 3/4/2025  -Mamm: 3/4/2025  -Dentist: 4/1/2025  -Dermatology: twice yearly, follows regularly

## 2025-07-09 ENCOUNTER — TELEPHONE (OUTPATIENT)
Dept: TRANSPLANT | Facility: HOSPITAL | Age: 60
End: 2025-07-09
Payer: COMMERCIAL

## 2025-07-09 DIAGNOSIS — Z94.2 LUNG TRANSPLANTED (MULTI): ICD-10-CM

## 2025-07-09 LAB
CMV DNA SERPL NAA+PROBE-LOG IU: NORMAL {LOG_IU}/ML
IGG SERPL-MCNC: 809 MG/DL (ref 700–1600)
LABORATORY COMMENT REPORT: NOT DETECTED

## 2025-07-09 NOTE — TELEPHONE ENCOUNTER
Labs reviewed on 7/9/25  WBC 4.7 ANC 2.62  Hbg 12.4  Platelets 171  Creatinine 0.82  Magnesium 1.68  Potassium 4.5  IgG pending  Prograf 5.0 Goal 5-7 Dose 2mg BID    -Changes made: None  -Labs due next 10/7/25    Per verbal order, patient okay to get labs done completed every 3 months. Called and spoke with patient in regards to labs and when to repeat labs. Patient did not answer, voicemail left and advised to return call if patient has any questions.

## 2025-08-06 ENCOUNTER — TELEPHONE (OUTPATIENT)
Dept: TRANSPLANT | Facility: HOSPITAL | Age: 60
End: 2025-08-06
Payer: COMMERCIAL

## 2025-08-06 DIAGNOSIS — J01.91 ACUTE RECURRENT SINUSITIS, UNSPECIFIED LOCATION: ICD-10-CM

## 2025-08-06 DIAGNOSIS — R05.8 COUGH PRODUCTIVE OF YELLOW SPUTUM: ICD-10-CM

## 2025-08-06 DIAGNOSIS — Z94.2 LUNG TRANSPLANTED (MULTI): ICD-10-CM

## 2025-08-06 NOTE — TELEPHONE ENCOUNTER
Patient called and stated that she has had a residual cough for over 10 weeks, dry and hacking. Recently, more productive in the morning, light yellow sputum improves throughout the day. Patients states that she does not feel right about it. Patient will make appointment at [facility] clinic for RVP swabs, Covid, RSV and will go to [place] for cxr this evening after work. Denies fever, SOB, chill. Will let physician know and follow up after testing completed.

## 2025-08-07 ENCOUNTER — TELEPHONE (OUTPATIENT)
Dept: TRANSPLANT | Facility: HOSPITAL | Age: 60
End: 2025-08-07

## 2025-08-07 ENCOUNTER — HOSPITAL ENCOUNTER (OUTPATIENT)
Dept: RADIOLOGY | Facility: HOSPITAL | Age: 60
Discharge: HOME | End: 2025-08-07
Payer: COMMERCIAL

## 2025-08-07 ENCOUNTER — TELEPHONE (OUTPATIENT)
Dept: TRANSPLANT | Facility: HOSPITAL | Age: 60
End: 2025-08-07
Payer: COMMERCIAL

## 2025-08-07 DIAGNOSIS — Z94.2 LUNG TRANSPLANTED (MULTI): ICD-10-CM

## 2025-08-07 DIAGNOSIS — T86.812: ICD-10-CM

## 2025-08-07 DIAGNOSIS — T86.812: Primary | ICD-10-CM

## 2025-08-07 PROCEDURE — 70486 CT MAXILLOFACIAL W/O DYE: CPT

## 2025-08-07 PROCEDURE — 71250 CT THORAX DX C-: CPT

## 2025-08-07 NOTE — TELEPHONE ENCOUNTER
Coordinator called to verbalize to patient that her CT scans were scheduled and that she has swabs ordered for collection as well. Patient did not answer, voicemail left.

## 2025-08-07 NOTE — TELEPHONE ENCOUNTER
Coordinator returns call to patient. Patient unable to get swabs due to the lab's inability to do swabs.    Patient will go to  specialty lab tomorrow instead.     No other questions at this time.

## 2025-08-08 ENCOUNTER — RESULTS FOLLOW-UP (OUTPATIENT)
Dept: TRANSPLANT | Facility: HOSPITAL | Age: 60
End: 2025-08-08

## 2025-08-08 ENCOUNTER — LAB (OUTPATIENT)
Dept: LAB | Facility: HOSPITAL | Age: 60
End: 2025-08-08
Payer: COMMERCIAL

## 2025-08-08 LAB — RSV RNA RESP QL NAA+PROBE: NOT DETECTED

## 2025-08-08 PROCEDURE — 87632 RESP VIRUS 6-11 TARGETS: CPT

## 2025-08-08 PROCEDURE — 87634 RSV DNA/RNA AMP PROBE: CPT

## 2025-08-08 RX ORDER — AMOXICILLIN AND CLAVULANATE POTASSIUM 875; 125 MG/1; MG/1
875 TABLET, FILM COATED ORAL 2 TIMES DAILY
Qty: 28 TABLET | Refills: 0 | Status: SHIPPED | OUTPATIENT
Start: 2025-08-08 | End: 2025-08-22

## 2025-08-08 NOTE — TELEPHONE ENCOUNTER
Called patient in regards to augmentin 875mg BID for 14 days, patient asked to sent script to [facility]. Patient has been taking flonase and will continue to take BID. No allergies noted. Will send scripts to physician to sign off.

## 2025-08-08 NOTE — TELEPHONE ENCOUNTER
----- Message from Daniel Tan sent at 8/8/2025 10:46 AM EDT -----  Regarding: RE:  If no allergies, Augmentin 875 mg twice daily for two weeks and Flonase over-the-counter nasal spray as directed.    For sinusitis  ----- Message -----  From: Stephanie Mi RN  Sent: 8/8/2025   8:22 AM EDT  To: Daniel Tan,     There is a small air-fluid level within the left maxillary sinus.  Clinical correlation for signs and symptoms of acute left maxillary  sinusitis suggested. There is mild mucosal thickening involving the  alveolar recess of bilateral maxillary sinuses. There is mucosal  thickening with partial opacification of the sphenoid sinus on the  right inferiorly. The bilateral ethmoid air cells and frontal sinuses  are well-aerated. The sphenoid sinus on the left is well-aerated.  ----- Message -----  From: Interface, Radiology Results In  Sent: 8/7/2025   6:04 PM EDT  To: Stephanie Mi RN

## 2025-08-12 LAB
ADENOVIRUS RVP, VIRC: NOT DETECTED
ENTEROVIRUS/RHINOVIRUS RVP, VIRC: NOT DETECTED
HUMAN BOCAVIRUS RVP, VIRC: NOT DETECTED
HUMAN CORONAVIRUS RVP, VIRC: NOT DETECTED
INFLUENZA A , VIRC: NOT DETECTED
INFLUENZA A H1N1-09 , VIRC: NOT DETECTED
INFLUENZA B PCR, VIRC: NOT DETECTED
METAPNEUMOVIRUS , VIRC: NOT DETECTED
PARAINFLUENZA PCR, VIRC: NOT DETECTED
RSV PCR, RVP, VIRC: NOT DETECTED

## 2025-10-07 ENCOUNTER — APPOINTMENT (OUTPATIENT)
Dept: TRANSPLANT | Facility: HOSPITAL | Age: 60
End: 2025-10-07
Payer: COMMERCIAL

## 2025-10-07 ENCOUNTER — APPOINTMENT (OUTPATIENT)
Dept: RESPIRATORY THERAPY | Facility: HOSPITAL | Age: 60
End: 2025-10-07
Payer: COMMERCIAL

## 2025-12-16 ENCOUNTER — APPOINTMENT (OUTPATIENT)
Dept: DERMATOLOGY | Facility: CLINIC | Age: 60
End: 2025-12-16
Payer: COMMERCIAL